# Patient Record
Sex: FEMALE | Race: BLACK OR AFRICAN AMERICAN | Employment: OTHER | URBAN - METROPOLITAN AREA
[De-identification: names, ages, dates, MRNs, and addresses within clinical notes are randomized per-mention and may not be internally consistent; named-entity substitution may affect disease eponyms.]

---

## 2017-06-07 ENCOUNTER — APPOINTMENT (OUTPATIENT)
Dept: PHYSICAL THERAPY | Age: 72
End: 2017-06-07

## 2017-06-14 ENCOUNTER — HOSPITAL ENCOUNTER (OUTPATIENT)
Dept: PHYSICAL THERAPY | Age: 72
Discharge: HOME | End: 2017-06-14
Payer: MEDICARE

## 2017-06-14 PROCEDURE — 97110 THERAPEUTIC EXERCISES: CPT

## 2017-06-14 PROCEDURE — G8978 MOBILITY CURRENT STATUS: HCPCS

## 2017-06-14 PROCEDURE — 97162 PT EVAL MOD COMPLEX 30 MIN: CPT

## 2017-06-14 PROCEDURE — G8979 MOBILITY GOAL STATUS: HCPCS

## 2017-06-14 NOTE — PROGRESS NOTES
PHYSICAL THERAPY - DAILY TREATMENT NOTE    Patient Name: Rosa Sullivan        Date: 2017  : 1945   YES Patient  Verified  Visit #:     Insurance: Payor: Rick Espinosa / Plan: [facility] CARE / Product Type: Managed Care Medicare /      In time: 1:00 Out time: 2:00   Total Treatment Time: 60     Medicare Time Tracking (below)   Total Timed Codes (min):  60 1:1 Treatment Time:  60     TREATMENT AREA =  R knee    SUBJECTIVE    Pain Level (on 0 to 10 scale):  4  / 10   Medication Changes/New allergies or changes in medical history, any new surgeries or procedures? YES    If yes, update Summary List   Subjective Functional Status/Changes:  []  No changes reported     History of Condition: Pt reports to PT with persistent knee pain that has been consistent after a TKA in  and 2 revisions due to pain. Pt denies any malpositioning of components or infections causing the revisions. Pt reports increased knee pain in the opposite knee now due to an antalgic gait pattern. 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)     Pt Goals: \"\"    FOTO:          OBJECTIVE  Physical Therapy Evaluation - Knee    Gait:  [] Normal    [] Abnormal    [] Antalgic    [] NWB    Device: SPC, upright 3-point gait    Describe:     ROM / Strength  [] Unable to assess            Strength (1-5)    Left Right   Hip Flexion 4+ 4+    Extension 3- 3-    Abduction 3- 3-    Adduction  IR/ER   4-/4-   4-/3-   Knee Flexion 5 5    Extension 5 5   Ankle Plantarflexion 4 4    Dorsiflexion 5 5       Flexibility: [] Unable to assess at this time  Hamstrings:    (L) Tightness= [] WNL   [x] Min   [] Mod   [] Severe    (R) Tightness= [] WNL   [] Min   [x] Mod   [] Severe  Quadriceps: (L) Tightness= [] WNL   [] Min   [] Mod   [] Severe    (R) Tightness= [] WNL   [] Min   [] Mod   [] Severe  Gastroc:      (L) Tightness= [] WNL   [] Min   [] Mod   [] Severe    (R) Tightness= [] WNL   [] Min   [] Mod   [] Severe  Other:    Palpation:   Neg/Pos  Neg/Pos  Neg/Pos   Joint Line  Quad tendon  Patellar ligament    Patella  Fibular head  Pes Anserinus    Tibial tubercle  Hamstring tendons  Infrapatellar fat pad      Optional Tests:   Patellar Positioning (Static)   []L []R Normal []L []R Lateral   []L []R Lopez Gallon      []L []R Medial   []L []R [place]    Patellar Tracking   []L []R Glide (Lat)   []L []R Tilt (Lat)     []L []R Glide (Med)  []L []R Tilt (Med)      []L []R Tile (Inf)     Patellar Mobility   []L []R Hypermobile []L []R Hypomobile         Girth Measurements in cm:      4 in above midpatella   2 in above midpatella  at  midpatella  2 in below midpatella   4 in below midpatella   Left        Right           Lachmans  [] Neg    [] Pos Posterior Drawer [] Neg    [] Pos  Pivot Shift  [] Neg    [] Pos Posterior Sag  [] Neg    [] Pos  Dayna's Test [] Neg    [] Pos Richard's Test  [] Neg    [] Pos  Squat   [] Neg    [] Pos          Ely's Test             [] Neg    [] Pos  Valgus@ 0 Degrees [] Neg    [] Pos Radha-Chapo [] Neg    [] Pos  Valgus@ 30 Degrees [] Neg    [] Pos Patellar Apprehension[] Neg    [] Pos  Varus@ 0 Degrees [] Neg    [] Pos Apley's Compression [] Neg    [] Pos  Varus@ 30 Degrees [] Neg    [] Pos Apley's Distraction [] Neg    [] Pos  Anterior Drawer [] Neg    [] Pos Other:                  [] Neg    [] Pos               Modalities Rationale: To decrease pain and inflammation.      min [] Estim, type/location:                                      []  att     []  unatt     []  w/US     []  w/ice    []  w/heat    min []  Mechanical Traction: type/lbs                   []  pro   []  sup   []  int   []  cont    []  before manual    []  after manual    min []  Ultrasound, settings/location:     10 min []  Iontophoresis w/ dexamethasone, location: R medial joint line                                              []  take home patch       []  in clinic    min []  Ice     []  Heat    location/position:     min []  Vasopneumatic Device, press/temp:     min []  Other:    [] Skin assessment post-treatment (if applicable):    []  intact    []  redness- no adverse reaction     []redness  adverse reaction:      25 min Therapeutic Exercise:  [x]  See flow sheet   Rationale:      increase ROM and increase strength to improve the patients ability to perform ADL's with improved hip girdle stability. 15 min Therapeutic Activity: FOTO administration   Rationale:    To determine a functional baseline from which to build a therapeutic exercise program.     min Patient Education:  YES  Reviewed HEP   []  Progressed/Changed HEP based on:   HEP issued      Other Objective/Functional Measures:    See above     Post Treatment Pain Level (on 0 to 10) scale:   4  / 10     ASSESSMENT    Assessment/Changes in Function:     Justification for Eval Code Complexity: MODERATE  Patient History (low 0, mod 1-2, high 3-4): OA, increased BMI, depression, bladder prolapse, 2 TKA revisions HIGH   Examination (low 1-2, mod 3+, high 4+): LE AROM, LE MMT, gait analysis MODERATE   Clinical Presentation (low: stable/uncomplicated; mod: evolving; high: unstable/unpredictable): unpredictable based off previous outcomes with PT HIGH  Clinical Decision Making (low , mod 26-74, high 1-25): 38 MODERATE     []  See Progress Note/Recertification   Patient will continue to benefit from skilled PT services to modify and progress therapeutic interventions, address functional mobility deficits, address ROM deficits, address strength deficits, analyze and address soft tissue restrictions, analyze and cue movement patterns, analyze and modify body mechanics/ergonomics and assess and modify postural abnormalities to attain remaining goals.    Progress toward goals / Updated goals:    Goals established, See PoC     PLAN    [x]  Upgrade activities as tolerated YES Continue plan of care   []  Discharge due to :    [x]  Other: Initiate PoC     Therapist: Luc Rios    Date: 6/14/2017 Time: 1:11 PM

## 2017-06-14 NOTE — PROGRESS NOTES
[address] [facility] PHYSICAL THERAPY  [address] [facility], [address] - Phone: [phone]  Fax: [phone] / [address] [facility]  Patient Name: Kelsey Rogel : 1945   Medical   Diagnosis: Right knee pain [M25.561] Treatment Diagnosis: R mechanical knee pain   Onset Date:      Referral Source: Chapincito George MD [facility]): 2017   Prior Hospitalization: See medical history Provider #: [id number]   Prior Level of Function: Independent with ADL's and ambulating without an AD   Comorbidities: OA, increased BMI, depression, bladder prolapse, 2 TKA revisions   Medications: Verified on Patient Summary List   The Plan of Care and following information is based on the information from the initial evaluation.   ===========================================================================================  Assessment / key information:  Kelsey Rogel is a 73 y.o.  female with Dx: Right knee pain [M25.561], signs and symptoms consistent with R mechanical knee pain. Pt reports to PT with persistent knee pain that has been consistent after a TKA in  and 2 revisions (last 16) due to pain. Pt denies any malpositioning of components or infections causing the revisions. Pt reports increased knee pain in the opposite knee due to her antalgic gait pattern and now wears a hinged knee brace on the L. The pt does does require assistance of a personal care aide 4 hours daily to assist with ADL's that are limited due to increased WB. Objective: Pt demonstrates 0-110 deg AROM on the R and 2-115 deg on the R. The pt demonstrates significant hip girdle strength deficits detailed below. The patient does have a positive valgus stress test on the R with medial joint pain but no laxity and lateral joint pain on the L with valgus stress testing again with no laxity.  The pt also demonstrates a positive Radha's on the R indicating possible meniscal involvement. The pt's patella on the L is significantly superiorly translated and laterally tilted as well. ROM / Strength  [] Unable to assess Strength (1-5)      Left Right   Hip Flexion 4+ 4+     Extension 3- 3-     Abduction 3- 3-     Adduction  IR/ER    4-/4-    4-/3-   Knee Flexion 5 5     Extension 5 5   Ankle Plantarflexion 4 4     Dorsiflexion 5 5      FOTO score 38 points indicating 62% limitation in functional ability. Patient would benefit from skilled PT to address the below listed impairments.  Thank you for your referral.  ===========================================================================================  Eval Complexity: History: HIGH Complexity :3+ comorbidities / personal factors will impact the outcome/ POC Exam:MEDIUM Complexity : 3 Standardized tests and measures addressing body structure, function, activity limitation and / or participation in recreation  Presentation: HIGH Complexity : Unstable and unpredictable characteristics  Clinical Decision Making:MEDIUM Complexity : FOTO score of 26-74Overall Complexity:MEDIUM    Problem List: pain affecting function, decrease ROM, decrease strength, impaired gait/ balance, decrease ADL/ functional abilitiies, decrease activity tolerance, decrease flexibility/ joint mobility and decrease transfer abilities   Treatment Plan may include any combination of the following: Therapeutic exercise, Therapeutic activities, Neuromuscular re-education, Physical agent/modality, Gait/balance training, Manual therapy, Aquatic therapy, Patient education, Self Care training, Functional mobility training, Home safety training and Stair training  Patient / Family readiness to learn indicated by: asking questions, trying to perform skills and interest  Persons(s) to be included in education: patient (P)  Barriers to Learning/Limitations: previous unsuccessful episodes of PT earlier this year  Measures taken: na Patient Goal (s): \"To make this R knee feel better so I can walk better, maybe without the cane. \"   Patient self reported health status: fair  Rehabilitation Potential: fair   Short Term Goals: To be accomplished in  2-3  weeks:  1. Patient will demonstrate compliance with HEP for symptom management at home. 2. Patient will demonstrate a sit<>stand with no UE support to indicate improved efficiency with ADL's.  3. Patient will report at least 25% improvements in symptoms associated with Buddhist activities.  Long Term Goals: To be accomplished in  4-6  weeks:  1. Patient will be independent with HEP to self-manage and prevent symptoms upon DC. 2.  Patient will improve FOTO score by at least 11 points to indicate improved functional status. 3.  Patient will demonstrate at least 4- hip extension and hip abduction MMT to improve pelvic stability with ambulation. Frequency / Duration:   Patient to be seen  2-3  times per week for 4-6  weeks:  Patient / Caregiver education and instruction: self care, activity modification and exercises  G-Codes (GP): Mobility: [id number] Current  CL= 60-79%   Goal  CK= 40-59%. The severity rating is based on the FOTO Score    Therapist Signature: Phu Hernandez DPT Date: 3/27/5235   Certification Period: 6/14/17 - 9/13/17 Time: 1:15 PM   ===========================================================================================  I certify that the above Physical Therapy Services are being furnished while the patient is under my care. I agree with the treatment plan and certify that this therapy is necessary. Physician Signature:        Date:       Time:     Please sign and return to [employer] or you may fax the signed copy to [phone]. Thank you.

## 2017-06-19 ENCOUNTER — APPOINTMENT (OUTPATIENT)
Dept: PHYSICAL THERAPY | Age: 72
End: 2017-06-19
Payer: MEDICARE

## 2017-06-21 ENCOUNTER — HOSPITAL ENCOUNTER (OUTPATIENT)
Dept: PHYSICAL THERAPY | Age: 72
Discharge: HOME | End: 2017-06-21
Payer: MEDICARE

## 2017-06-21 ENCOUNTER — APPOINTMENT (OUTPATIENT)
Dept: PHYSICAL THERAPY | Age: 72
End: 2017-06-21
Payer: MEDICARE

## 2017-06-21 PROCEDURE — 97530 THERAPEUTIC ACTIVITIES: CPT

## 2017-06-21 PROCEDURE — 97110 THERAPEUTIC EXERCISES: CPT

## 2017-06-21 NOTE — PROGRESS NOTES
PHYSICAL THERAPY - DAILY TREATMENT NOTE    Patient Name: Yolis Hannah        Date: 2017  : 1945   yes Patient  Verified  Visit #:   2     Insurance: Payor: Jacque Morgantana / Plan: [employer] [facility] / Product Type: Managed Care Medicare /      In time: 200 Out time: 244   Total Treatment Time: 44     Medicare Time Tracking (below)   Total Timed Codes (min):  34 1:1 Treatment Time:  34     TREATMENT AREA =  Right knee pain [M25.561]    SUBJECTIVE  Pain Level (on 0 to 10 scale):  3  / 10   Medication Changes/New allergies or changes in medical history, any new surgeries or procedures?    no  If yes, update Summary List   Subjective Functional Status/Changes:  []  No changes reported     \"I know I need to walk better.  I will do my HEP everyday\"          OBJECTIVE  Modalities Rationale:     decrease inflammation and decrease pain to improve patient's ability to  perform ADLs/prolong stding and amb/stairs with ease    min [] Estim, type/location:                                      []  att     []  unatt     []  w/US     []  w/ice    []  w/heat    min []  Mechanical Traction: type/lbs                   []  pro   []  sup   []  int   []  cont    []  before manual    []  after manual    min []  Ultrasound, settings/location:      min []  Iontophoresis w/ dexamethasone, location:                                               []  take home patch       []  in clinic   10 min [x]  Ice     []  Heat    location/position: supine with elevation    min []  Vasopneumatic Device, press/temp:     min []  Other:    [x] Skin assessment post-treatment (if applicable):    [x]  intact    []  redness- no adverse reaction     []redness  adverse reaction:        26 min Therapeutic Exercise:  [x]  See flow sheet   Rationale:      increase ROM and increase strength to improve the patients ability to  perform ADLs/prolong stding and amb/stairs with ease          8 min Therapeutic Activity: HRs for push off and TRs for ankle HS during gait  HK amb x 45' with SC and supervision    Rationale:    increase ROM, increase strength, improve coordination, improve balance and increase proprioception to improve the patients ability to  perform ADLs/prolong stding and amb/stairs with ease             min Patient Education:  yes  Reviewed HEP   []  Progressed/Changed HEP based on:  Pt ed on importance and benefits of compliance with HEP, core strength/stability and proper posture; pt verbalized understanding         Other Objective/Functional Measures:    VCs + demo to perform proper technique for TE  Initiated TE per flowsheet without c/o p!  demos poor gait tech,  VCs to increase hip/knee flex during swing phase and to increase HS for proper gait  demos 25% HR AROM    Post Treatment Pain Level (on 0 to 10) scale:   0  / 10     ASSESSMENT  Assessment/Changes in Function:     Progressed there-ex without c/o increase p! []  See Progress Note/Recertification   Patient will continue to benefit from skilled PT services to modify and progress therapeutic interventions, address functional mobility deficits, address ROM deficits, address strength deficits, analyze and address soft tissue restrictions, analyze and cue movement patterns, analyze and modify body mechanics/ergonomics, assess and modify postural abnormalities and instruct in home and community integration to attain remaining goals.    Progress toward goals / Updated goals:    Pt's first visit since IE, no noted progress        PLAN  [x]  Upgrade activities as tolerated yes Continue plan of care   []  Discharge due to :    []  Other:      Therapist: Khadijah Mckay PTA    Date: 6/21/2017 Time: 2:44 PM     Future Appointments  Date Time Provider Radha Bryson   6/26/2017 2:00 PM [address] [place] [address]   6/29/2017 2:00 PM [address] [place] [address]

## 2017-06-26 ENCOUNTER — APPOINTMENT (OUTPATIENT)
Dept: PHYSICAL THERAPY | Age: 72
End: 2017-06-26
Payer: MEDICARE

## 2017-06-29 ENCOUNTER — HOSPITAL ENCOUNTER (OUTPATIENT)
Dept: PHYSICAL THERAPY | Age: 72
Discharge: HOME | End: 2017-06-29
Payer: MEDICARE

## 2017-06-29 PROCEDURE — 97110 THERAPEUTIC EXERCISES: CPT

## 2017-06-29 NOTE — PROGRESS NOTES
PHYSICAL THERAPY - DAILY TREATMENT NOTE    Patient Name: Maryanne Res        Date: 2017  : 1945   YES Patient  Verified  Visit #:   3     Insurance: Payor: Kena Mckenna / Plan: [employer] / Product Type: Managed Care Medicare /      In time: 1:55 Out time: 2:37   Total Treatment Time: 42     Medicare Time Tracking (below)   Total Timed Codes (min):  32 1:1 Treatment Time:  32     TREATMENT AREA =   Right knee pain [M25.561    SUBJECTIVE    Pain Level (on 0 to 10 scale):  4  / 10   Medication Changes/New allergies or changes in medical history, any new surgeries or procedures? NO    If yes, update Summary List   Subjective Functional Status/Changes:  []  No changes reported     Pt c/o pain B knees, states her L one is starting to hurt her too. Pt reports soreness for a couple of days after last PT session.            OBJECTIVE    Modalities Rationale:  palliative      min [] Estim, type/location:                                      []  att     []  unatt     []  w/US     []  w/ice    []  w/heat    min []  Mechanical Traction: type/lbs                   []  pro   []  sup   []  int   []  cont    []  before manual    []  after manual    min []  Ultrasound, settings/location:      min []  Iontophoresis w/ dexamethasone, location:                                               []  take home patch       []  in clinic   10 min [x]  Ice     []  Heat    location/position: Supine with LE wedge    min []  Vasopneumatic Device, press/temp:     min []  Other:    [x] Skin assessment post-treatment (if applicable):    [x]  intact    []  redness- no adverse reaction     []redness  adverse reaction:      32 min Therapeutic Exercise:  [x]  See flow sheet   Rationale:     Increase LE ROM/flexibility, increase strength and increase proprioception to improve the patients ability to perform ADL's and gait safely and I to allow for increased activity tolerance and increased functional mobility. min Patient Education:  YES  Reviewed HEP   [x]  Progressed/Changed HEP based on:   Issued HEP, see HO in chart     Other Objective/Functional Measures:    Pt with c/o fatigue and L knee pain with warm-up on recumbent bike, only able to perform 4' this session. Added SLR for increased quad strength. VCing for quad set to prevent quad lag. VCing for neutral hip alignment with SL hip abd. Pt with c/o ms fatigue. Post Treatment Pain Level (on 0 to 10) scale:   4  / 10     ASSESSMENT    Assessment/Changes in Function:     Pt with good tolerance to ther-ex, but no change in pain level reported. []  See Progress Note/Recertification   Patient will continue to benefit from skilled PT services tomodify and progress therapeutic interventions, address functional mobility deficits, address ROM deficits, address strength deficits, analyze and address soft tissue restrictions, analyze and cue movement patterns, analyze and modify body mechanics/ergonomics, assess and modify postural abnormalities and instruct in home and community integration to attain remaining goals. Progress toward goals / Updated goals: · Short Term Goals: To be accomplished in  2-3  weeks:  1. Patient will demonstrate compliance with HEP for symptom management at home.established HEP 6/29/17  2. Patient will demonstrate a sit<>stand with no UE support to indicate improved efficiency with ADL's.  3. Patient will report at least 25% improvements in symptoms associated with Scientology activities.      PLAN    []  Upgrade activities as tolerated YES Continue plan of care   []  Discharge due to :    []  Other:      Therapist: Fouzia Felton PTA    Date: 6/29/2017 Time: 3:58 PM     Future Appointments  Date Time Provider Radha Bryson   7/5/2017 1:00 PM [address] [place] [address]   7/7/2017 2:00 PM [address] [place] [address]   7/17/2017 1:30 PM [address] [place] [address]   7/21/2017 1:30 PM [address] [facility]   7/24/2017 1:00  [facility]   7/28/2017 1:00  [facility]

## 2017-07-05 ENCOUNTER — APPOINTMENT (OUTPATIENT)
Dept: PHYSICAL THERAPY | Age: 72
End: 2017-07-05
Payer: MEDICARE

## 2017-07-07 ENCOUNTER — APPOINTMENT (OUTPATIENT)
Dept: PHYSICAL THERAPY | Age: 72
End: 2017-07-07
Payer: MEDICARE

## 2017-07-17 ENCOUNTER — APPOINTMENT (OUTPATIENT)
Dept: PHYSICAL THERAPY | Age: 72
End: 2017-07-17
Payer: MEDICARE

## 2017-07-21 ENCOUNTER — HOSPITAL ENCOUNTER (OUTPATIENT)
Dept: PHYSICAL THERAPY | Age: 72
Discharge: HOME | End: 2017-07-21
Payer: MEDICARE

## 2017-07-21 PROCEDURE — 97033 APP MDLTY 1+IONTPHRSIS EA 15: CPT

## 2017-07-21 PROCEDURE — G8979 MOBILITY GOAL STATUS: HCPCS

## 2017-07-21 PROCEDURE — 97110 THERAPEUTIC EXERCISES: CPT

## 2017-07-21 PROCEDURE — G8978 MOBILITY CURRENT STATUS: HCPCS

## 2017-07-21 PROCEDURE — 97530 THERAPEUTIC ACTIVITIES: CPT

## 2017-07-21 NOTE — PROGRESS NOTES
[address]  [facility] PHYSICAL THERAPY  [address] [facility] [address], [address] - Phone: [phone]  Fax: [phone] OF CARE/RECERTIFICATION FOR PHYSICAL THERAPY          Patient Name: Shanti Baig : 1945   Treatment/Medical Diagnosis: Right knee pain [M25.561]   Onset Date:     Referral Source: Barbie Collet, MD [facility]): 17   Prior Hospitalization: See Medical History Provider #: [id number]   Prior Level of Function: Independent with ADL's and ambulating without an AD   Comorbidities: OA, increased BMI, depression, bladder prolapse, 2 TKA revisions   Medications: Verified on Patient Summary List   Visits from [facility]: 4 Missed Visits: 4     Goal/Measure of Progress Goal Met? 1. Patient will demonstrate compliance with HEP for symptom management at home. Status at last Eval: NA Current Status: noncompliant no   2. Patient will demonstrate a sit<>stand with no UE support to indicate improved efficiency with ADL's. Status at last Eval: B HHA Current Status: B HHA no   3. Patient will report at least 25% improvements in symptoms associated with Amish activities   Status at last Eval: NA Current Status: 0% no     Therapy has consisted of active warm-up on the bike, knee flexion AAROM exercises, and hip girdle strengthening. Key Functional Changes/Progress: Pt's FOTO score decreased to 24 indicating 66% limitation in functional ability. The pt correlated her increase in pain to being out of town in the midst of her PT for longer than expected and and poor compliance with her HEP during this time. The pt can perform exercises without an increase in pain, but still requires B HHA with sit<>stand transfers.     Problem List: pain affecting function, decrease strength, edema affecting function, impaired gait/ balance, decrease ADL/ functional abilitiies, decrease activity tolerance, decrease flexibility/ joint mobility and decrease transfer abilities   Treatment Plan may include any combination of the following: Therapeutic exercise, Therapeutic activities, Neuromuscular re-education, Physical agent/modality, Gait/balance training, Manual therapy, Aquatic therapy, Patient education, Self Care training, Functional mobility training, Home safety training and Stair training   Goals for this certification period include and are to be achieved in   3-4  weeks:  1. Continue LTG above. Frequency / Duration:   Patient to be seen   2-3   times per week for   3-4    weeks:  G-Codes (GP): Mobility: [id number] Current  CL= 60-79%   Goal  CK= 40-59%. The severity rating is based on the FOTO Score  Assessments/Recommendations: The patient would benefit from continued skilled interventions to address the pt's functional limitaitons. However, with continued noncompliance, pt will be DC. If you have any questions/comments please contact us directly at [phone]. Thank you for allowing us to assist in the care of your patient. Therapist Signature: Brian Akers DPT Date: 6/87/6433   Certification Period:  Reporting Period: 6/14/17 - 9/13/17  /14/17 - 7/21/17 Time: 5:15 PM   NOTE TO PHYSICIAN:  PLEASE COMPLETE THE ORDERS BELOW AND FAX TO   [facility]: [phone] If you are unable to process this request in 24 hours please contact our office: [phone].    ___ I have read the above report and request that my patient continue as recommended.   ___ I have read the above report and request that my patient continue therapy with the following changes/special instructions: ________________________________________________   ___ I have read the above report and request that my patient be discharged from therapy.      Physician Signature:        Date:       Time:

## 2017-07-21 NOTE — PROGRESS NOTES
PHYSICAL THERAPY - DAILY TREATMENT NOTE    Patient Name: Cooper Guajardo        Date: 2017  : 1945   YES Patient  Verified  Visit #:   4     Insurance: Payor: [employer]  / Plan: [employer] / Product Type: Managed Care Medicare /      In time: 1:30 Out time: 2:10   Total Treatment Time: 40     Medicare Time Tracking (below)   Total Timed Codes (min):  40 1:1 Treatment Time:  40     TREATMENT AREA =  Right knee pain [M25.561    SUBJECTIVE    Pain Level (on 0 to 10 scale):  4  / 10   Medication Changes/New allergies or changes in medical history, any new surgeries or procedures? NO    If yes, update Summary List   Subjective Functional Status/Changes:  []  No changes reported     Pt reports that she was out of town longer than expected and wasn't good about doing her exercises while she was gone. Pt reports no change in her R knee pain at this time and states that she is still having pain in R knee that limits her standing and walking tolerance. Pt c/o pain on the inside of her R knee and R shin. Pt also c/o intermittent L knee pain.         OBJECTIVE    Modalities Rationale:  decrease pain and inflammation      min [] Estim, type/location:                                      []  att     []  unatt     []  w/US     []  w/ice    []  w/heat    min []  Mechanical Traction: type/lbs                   []  pro   []  sup   []  int   []  cont    []  before manual    []  after manual    min []  Ultrasound, settings/location:     8 min [x]  Iontophoresis w/ dexamethasone, location: take home patch to medial R knee                                              []  take home patch       []  in clinic    min []  Ice     []  Heat    location/position:     min []  Vasopneumatic Device, press/temp:     min []  Other:    [x] Skin assessment post-treatment (if applicable):    [x]  intact    []  redness- no adverse reaction     []redness  adverse reaction:      22 min Therapeutic Exercise:  [x]  See flow sheet   Rationale:     Increase LE ROM/flexibility, increase strength and increase proprioception to improve the patients ability to perform ADL's and gait safely and I to allow for increased activity tolerance and increased functional mobility. 10 min Therapeutic Activity: FOTO    Rationale:    functional assessment       min Patient Education:  YES  Reviewed HEP   []  Progressed/Changed HEP based on:   Discussed importance of compliance with HEP and pt to resume ex daily at home. Pt agreeable. Other Objective/Functional Measures:    FOTO 34    Pt requires 2 UE A for sit>stand and 1 UE A for stand>sit secondary to R>L knee pain. Post Treatment Pain Level (on 0 to 10) scale:   4  / 10     ASSESSMENT    Assessment/Changes in Function:     Pt's FOTO score decreased 4 points since IE indicating minimal change in activity tolerance and correlates to a 66% functional limitation. Mobility [id number] Current  CL= 60-79%   Goal  CK= 40-59%. The severity rating is based on the FOTO Score     [x]  See Progress Note/Recertification   Patient will continue to benefit from skilled PT services to modify and progress therapeutic interventions, address functional mobility deficits, address ROM deficits, address strength deficits, analyze and address soft tissue restrictions, analyze and cue movement patterns, analyze and modify body mechanics/ergonomics, assess and modify postural abnormalities and instruct in home and community integration to attain remaining goals. Progress toward goals / Updated goals: · Short Term Goals: To be accomplished in  2-3  weeks:  1. Patient will demonstrate compliance with HEP for symptom management at home. NOT MET   2. Patient will demonstrate a sit<>stand with no UE support to indicate improved efficiency with ADL's. NOT MET   3. Patient will report at least 25% improvements in symptoms associated with Jew activities.  NOT MET PLAN    []  Upgrade activities as tolerated YES Continue plan of care   []  Discharge due to :    []  Other:      Therapist: Shabana Holder PTA    Date: 7/21/2017 Time: 1:34 PM     Future Appointments  Date Time Provider Radha Bryson   7/24/2017 1:00  [facility]   7/28/2017 1:00  [facility]

## 2017-07-24 ENCOUNTER — HOSPITAL ENCOUNTER (OUTPATIENT)
Dept: PHYSICAL THERAPY | Age: 72
Discharge: HOME | End: 2017-07-24
Payer: MEDICARE

## 2017-07-24 PROCEDURE — 97033 APP MDLTY 1+IONTPHRSIS EA 15: CPT

## 2017-07-24 PROCEDURE — 97110 THERAPEUTIC EXERCISES: CPT

## 2017-07-24 PROCEDURE — 97530 THERAPEUTIC ACTIVITIES: CPT

## 2017-07-24 NOTE — PROGRESS NOTES
PHYSICAL THERAPY - DAILY TREATMENT NOTE    Patient Name: Elizabeth Small        Date: 2017  : 1945   YES Patient  Verified  Visit #:    Insurance: Payor: [employer] / Plan: [employer] / Product Type: Managed Care Medicare /      In time: 1:00 Out time: 2:05   Total Treatment Time: 65     Medicare Time Tracking (below)   Total Timed Codes (min):  65 1:1 Treatment Time:  65     TREATMENT AREA =  Right knee pain [M25.561]    SUBJECTIVE    Pain Level (on 0 to 10 scale):  3  / 10   Medication Changes/New allergies or changes in medical history, any new surgeries or procedures? NO     If yes, update Summary List   Subjective Functional Status/Changes:  []  No changes reported     \"I will be able to start coming more now. \"          OBJECTIVE    Modalities: Iontophoresis to decrease pain and inflammation. 4 hour patch applied to medial joint line. 8 minutes setup. 44 min Therapeutic Exercise:  [x]  See flow sheet   Rationale:      increase ROM and increase strength to improve the patients ability to perform ADL's with improved LE flexiblity and stability. 8 min Therapeutic Activity: Sit<>stand with R theraband 2 x 10, gait training with SPC 3 x 30 ft   Rationale: To increase safety and efficiency    min Patient Education:  YES  Reviewed HEP   []  Progressed/Changed HEP based on:   Patient reports compliance     Other Objective/Functional Measures:    Pt had difficulty with sit<>stand from a low seat, but could perform with appropriate form from a higher seat. Pt ambulates with significant R lateral lean which she can correct when taking smaller more purposeful steps. See flowsheet for more details. Post Treatment Pain Level (on 0 to 10) scale:   2  / 10     ASSESSMENT    Assessment/Changes in Function:     Pt presents to PT progressing in LE strengthening and will continue to decrease trendelenberg pattern related knee valgus and pain.      [] See Progress Note/Recertification   Patient will continue to benefit from skilled PT services to modify and progress therapeutic interventions, address functional mobility deficits, address ROM deficits, address strength deficits, analyze and address soft tissue restrictions, analyze and cue movement patterns and analyze and modify body mechanics/ergonomics to attain remaining goals. Progress toward goals / Updated goals:    First follow-up since reassessment.      PLAN    [x]  Upgrade activities as tolerated YES Continue plan of care   []  Discharge due to :    []  Other:      Therapist: Karilyn Soulier    Date: 7/24/2017 Time: 1:08 PM     Future Appointments  Date Time Provider Radha Bryson   7/28/2017 1:00 PM [address] [place] [address]       Progress toward goals / Updated goals:

## 2017-07-28 ENCOUNTER — HOSPITAL ENCOUNTER (OUTPATIENT)
Dept: PHYSICAL THERAPY | Age: 72
End: 2017-07-28
Payer: MEDICARE

## 2017-07-31 ENCOUNTER — HOSPITAL ENCOUNTER (OUTPATIENT)
Dept: PHYSICAL THERAPY | Age: 72
Discharge: HOME | End: 2017-07-31
Payer: MEDICARE

## 2017-07-31 PROCEDURE — 97033 APP MDLTY 1+IONTPHRSIS EA 15: CPT

## 2017-07-31 PROCEDURE — 97140 MANUAL THERAPY 1/> REGIONS: CPT

## 2017-07-31 PROCEDURE — 97110 THERAPEUTIC EXERCISES: CPT

## 2017-07-31 NOTE — PROGRESS NOTES
PHYSICAL THERAPY - DAILY TREATMENT NOTE    Patient Name: Lisandro Moffett        Date: 2017  : 1945   YES Patient  Verified  Visit #:     Insurance: Payor: Lamar Gillis / Plan: [employer] / Product Type: Managed Care Medicare /      In time: 130 Out time: 2:10   Total Treatment Time: 40     Medicare Time Tracking (below)   Total Timed Codes (min):  40 1:1 Treatment Time:  38     TREATMENT AREA = Right knee pain [M25.561]    SUBJECTIVE    Pain Level (on 0 to 10 scale):  3  / 10   Medication Changes/New allergies or changes in medical history, any new surgeries or procedures? NO    If yes, update Summary List   Subjective Functional Status/Changes:  []  No changes reported     \"It's been a lot better since I started. The only thing now is it's stiff and catches sometimes when I first get up to stand or walk. \"          OBJECTIVE    24(22) min Therapeutic Exercise:  [x]  See flow sheet   Rationale:      increase ROM, increase strength and facilitate proper motor control to improve the patients ADL tolerance and return to function     8 min Manual Therapy: Involved Knee Patellar G2-G4 mobs inferior/medial, STM to quads/HS/ITB interface, Ant/Post G3-G4 Tibofemoral glides, PROM flexion/Ext contract/relax   Rationale:      decrease pain, increase ROM, increase tissue extensibility, decrease trigger points and facilitate motor control to improve patient's ADL tolerance and return to function       Modalities Rationale: Prophylaxis/Palliative        min [] Estim, type/location:                                      []  att     []  unatt     []  w/US     []  w/ice    []  w/heat    min []  Mechanical Traction: type/lbs                   []  pro   []  sup   []  int   []  cont    []  before manual    []  after manual    min []  Ultrasound, settings/location:     8 min [x]  Iontophoresis w/ dexamethasone, location: Medial aspect of knee []  take home patch       []  in clinic    min []  Ice     []  Heat    Position/location:     min []  Vasopneumatic Device, press/temp:     min []  Other:    [] Skin assessment post-treatment (if applicable):    []  intact    []  redness- no adverse reaction     []redness  adverse reaction:      Throughout Rx min Patient Education:  YES  Reviewed HEP   []  Progressed/Changed HEP based on:   Display of proper form in clinic  Improvement in condition and current complaints     Other Objective/Functional Measures:    Increased reps/sets/resistance per flow sheet. Post Treatment Pain Level (on 0 to 10) scale:   2  / 10     ASSESSMENT    Assessment/Changes in Function:     Tolerated treatment well without complaints of progression, indicating improved functional mobility       []  See Progress Note/Recertification   Patient will continue to benefit from skilled PT services to  modify and progress therapeutic interventions, address functional mobility deficits, address ROM deficits, address strength deficits, analyze and address soft tissue restrictions, analyze and cue movement patterns, analyze and modify body mechanics/ergonomics and instruct in home and community integration to attain remaining goals. Progress toward goals / Updated goals:    1st session since initial eval, no significant progress noted. PLAN     []  Upgrade activities as tolerated YES Continue plan of care   []  Discharge due to :    []  Other:      Therapist: Tyler Rosenberg \"BJ\" ISAAC Oliva, Cert. MDT, Cert. DN, Cert.  SMT    Date: 7/31/2017 Time: 2:13 PM   Future Appointments  Date Time Provider Radha Bryson   8/4/2017 2:00 PM [address] [place] [address]   8/7/2017 2:00 PM [address] [place] [address]   8/10/2017 2:30 PM [address] [place] [address]   8/14/2017 2:00 PM [facility]   8/17/2017 2:00 PM [address] [place] [address]   8/21/2017 2:00 PM [address] [place] [address]   8/24/2017 2:30 PM [place] [address] [place] [address],Second Floor [facility]   8/28/2017 2:00  [facility]   8/31/2017 2:00  [facility]

## 2017-08-04 ENCOUNTER — APPOINTMENT (OUTPATIENT)
Dept: PHYSICAL THERAPY | Age: 72
End: 2017-08-04
Payer: MEDICARE

## 2017-08-07 ENCOUNTER — HOSPITAL ENCOUNTER (OUTPATIENT)
Dept: PHYSICAL THERAPY | Age: 72
Discharge: HOME | End: 2017-08-07
Payer: MEDICARE

## 2017-08-07 PROCEDURE — 97110 THERAPEUTIC EXERCISES: CPT

## 2017-08-07 NOTE — PROGRESS NOTES
PHYSICAL THERAPY - DAILY TREATMENT NOTE    Patient Name: Cassie Flanagan        Date: 2017  : 1945   YES Patient  Verified  Visit #:     Insurance: Payor: Irving Chirinos / Plan: [place] [employer] [facility] / Product Type: Managed Care Medicare /      In time: 2:00 Out time: 2:40   Total Treatment Time: 40     Medicare Time Tracking (below)   Total Timed Codes (min):  30 1:1 Treatment Time:  30     TREATMENT AREA =  Right knee pain [M25.561]    SUBJECTIVE    Pain Level (on 0 to 10 scale):  3  / 10   Medication Changes/New allergies or changes in medical history, any new surgeries or procedures? NO    If yes, update Summary List   Subjective Functional Status/Changes:  []  No changes reported     Pt reports that her knee still get stiff, but feels like it's getting better slowly. Pt reports some soreness after manual PT last session.         OBJECTIVE    Modalities Rationale:  palliative      min [] Estim, type/location:                                      []  att     []  unatt     []  w/US     []  w/ice    []  w/heat    min []  Mechanical Traction: type/lbs                   []  pro   []  sup   []  int   []  cont    []  before manual    []  after manual    min []  Ultrasound, settings/location:      min []  Iontophoresis w/ dexamethasone, location:                                               []  take home patch       []  in clinic   10 min [x]  Ice     []  Heat    location/position: Supine with LE wedge    min []  Vasopneumatic Device, press/temp:     min []  Other:    [x] Skin assessment post-treatment (if applicable):    [x]  intact    [x]  redness- no adverse reaction     []redness  adverse reaction:      30 min Therapeutic Exercise:  [x]  See flow sheet   Rationale:     Increase LE ROM/flexibility, increase strength and increase proprioception to improve the patients ability to perform ADL's and gait safely and I to allow for increased activity tolerance and increased functional mobility. min Patient Education:  YES  Reviewed HEP   []  Progressed/Changed HEP based on: Other Objective/Functional Measures:    Pt with c/o R knee pain with unilateral WBing on R LE for hip hikes. Added hip extension in standing. Had pt perform alternating to decrease SLS time. VCing for hold time on LE stretches. Post Treatment Pain Level (on 0 to 10) scale:   2  / 10     ASSESSMENT    Assessment/Changes in Function:     Pt with good tolerance to progression of standing ex. []  See Progress Note/Recertification   Patient will continue to benefit from skilled PT services to modify and progress therapeutic interventions, address functional mobility deficits, address ROM deficits, address strength deficits, analyze and address soft tissue restrictions, analyze and cue movement patterns, analyze and modify body mechanics/ergonomics and instruct in home and community integration to attain remaining goals     Progress toward goals / Updated goals:    · Long Term Goals: To be accomplished in  4-6  weeks:  1. Patient will be independent with HEP to self-manage and prevent symptoms upon DC. 2.  Patient will improve FOTO score by at least 11 points to indicate improved functional status.   3.  Patient will demonstrate at least 4- hip extension and hip abduction MMT to improve pelvic stability Added standing hip ext this visit     PLAN    []  Upgrade activities as tolerated YES Continue plan of care   []  Discharge due to :    []  Other:      Therapist: Danice Habermann, PTA    Date: 8/7/2017 Time: 4:33 PM     Future Appointments  Date Time Provider Radha Bryson   8/10/2017 2:30 PM [address] [place] [address]   8/14/2017 2:00 PM [facility]   8/17/2017 2:00 PM [address] [place] [address]   8/21/2017 2:00 PM [address] [place] [address]   8/24/2017 2:30 PM [address] [place] [address]   8/28/2017 2:00 PM [address] [place] [address]   8/31/2017 2:00  [facility]

## 2017-08-10 ENCOUNTER — APPOINTMENT (OUTPATIENT)
Dept: PHYSICAL THERAPY | Age: 72
End: 2017-08-10
Payer: MEDICARE

## 2017-08-11 ENCOUNTER — HOSPITAL ENCOUNTER (OUTPATIENT)
Dept: PHYSICAL THERAPY | Age: 72
Discharge: HOME | End: 2017-08-11
Payer: MEDICARE

## 2017-08-11 PROCEDURE — 97140 MANUAL THERAPY 1/> REGIONS: CPT

## 2017-08-11 PROCEDURE — 97110 THERAPEUTIC EXERCISES: CPT

## 2017-08-11 PROCEDURE — 97530 THERAPEUTIC ACTIVITIES: CPT

## 2017-08-11 NOTE — PROGRESS NOTES
PHYSICAL THERAPY - DAILY TREATMENT NOTE    Patient Name: [facility]        Date: 2017  : 1945   YES Patient  Verified  Visit #:     Insurance: Payor: Veronica Osei / Plan: [employer] / Product Type: Managed Care Medicare /      In time: 2:10 Out time: 3:05   Total Treatment Time: 55     Medicare Time Tracking (below)   Total Timed Codes (min):  55 1:1 Treatment Time:  55     TREATMENT AREA =  Right knee pain [M25.561]    SUBJECTIVE    Pain Level (on 0 to 10 scale):  3  / 10   Medication Changes/New allergies or changes in medical history, any new surgeries or procedures? NO     If yes, update Summary List   Subjective Functional Status/Changes:  []  No changes reported     \"I don't think the patch makes any difference. Maybe I can get in the pool. \"          OBJECTIVE    39 min Therapeutic Exercise:  [x]  See flow sheet   Rationale:      increase ROM and increase strength to improve the patients ability to perform ADL's with improved hip girdle stability. 8 min Therapeutic Activity: Aquatics paperwork, step-up's, HK ambulation   Rationale: To increase safety and efficiency with ADL's.    8 min Manual Therapy: IASTM to the ITB with small and medium cups and PCP pipe   Rationale:      decrease pain, increase ROM and increase tissue extensibility to improve patient's ability to ambulate with improved activity tolerance. min Patient Education:  YES  Reviewed HEP   []  Progressed/Changed HEP based on:   Patient reports compliance     Other Objective/Functional Measures:    Pt still has difficulty with SLS activities and therefore performed exercises in standing in an alternating manner. The pt attempted IASTM to the ITB; however the pt could not tolerate either modality and requested not to perform again in the future. See below for more details.      Post Treatment Pain Level (on 0 to 10) scale:   2  / 10     ASSESSMENT    Assessment/Changes in Function:     Pt presents to PT progressing slowly in a strengthening program, with minimal symptom recovery. Started pool paperwork, but since the schedule is full administered the pt a list of free local pools to start ambulating in the water. []  See Progress Note/Recertification   Patient will continue to benefit from skilled PT services to modify and progress therapeutic interventions, address functional mobility deficits, address ROM deficits, address strength deficits, analyze and cue movement patterns and analyze and modify body mechanics/ergonomics to attain remaining goals. Progress toward goals / Updated goals: Addressed LTG 2 with bridges.      PLAN    [x]  Upgrade activities as tolerated YES Continue plan of care   []  Discharge due to :    []  Other:      Therapist: Eli Mcnair    Date: 8/11/2017 Time: 2:19 PM     Future Appointments  Date Time Provider Radha Bryson   8/14/2017 2:00 PM [facility]   8/17/2017 2:00 PM [address] [place] [address]   8/21/2017 2:00 PM [address] [place] [address]   8/24/2017 2:30 PM [address] [place] [address]   8/28/2017 2:00 PM [address] [place] [address]   8/31/2017 2:00 PM [address] [place] [address]

## 2017-08-14 ENCOUNTER — APPOINTMENT (OUTPATIENT)
Dept: PHYSICAL THERAPY | Age: 72
End: 2017-08-14
Payer: MEDICARE

## 2017-08-17 ENCOUNTER — HOSPITAL ENCOUNTER (OUTPATIENT)
Dept: PHYSICAL THERAPY | Age: 72
Discharge: HOME | End: 2017-08-17
Payer: MEDICARE

## 2017-08-17 PROCEDURE — G8979 MOBILITY GOAL STATUS: HCPCS

## 2017-08-17 PROCEDURE — 97110 THERAPEUTIC EXERCISES: CPT

## 2017-08-17 PROCEDURE — G8978 MOBILITY CURRENT STATUS: HCPCS

## 2017-08-17 PROCEDURE — 97530 THERAPEUTIC ACTIVITIES: CPT

## 2017-08-17 NOTE — PROGRESS NOTES
[address]  [facility] PHYSICAL THERAPY  [address], [address] Phone: [phone]  Fax: [phone] OF CARE/RECERTIFICATION FOR PHYSICAL THERAPY          Patient Name: Lisandro Moffett : 1945   Treatment/Medical Diagnosis: Right knee pain [M25.561]   Onset Date:     Referral Source: Gail Car MD Start of [facility]): 17   Prior Hospitalization: See Medical History Provider #: [id number]   Prior Level of Function: Independent with ADL's and ambulating without an AD   Comorbidities: OA, increased BMI, depression, bladder prolapse, 2 TKA revisions   Medications: Verified on Patient Summary List   Visits from [facility]: 9 Missed Visits: 4     Goal/Measure of Progress Goal Met? 1. Patient will demonstrate compliance with HEP for symptom management at home. Status at last Eval: noncompliant Current Status: compliant yes   2.  *Patient will demonstrate a sit<>stand with no UE support to indicate improved efficiency with ADL's. Status at last Eval: UE support Current Status: No UE support, moderate difficulty Progressing   3. Patient will report at least 25% improvements in symptoms associated with [employer] activities   Status at last Eval: 0% Current Status: 25% yes     Therapy has consisted of active warm-up on the bike, knee flexion AAROM exercises, hip girdle strengthening exercises, hamstring stretches, balance exercises and stair negotiation. Key Functional Changes/Progress: Pt's FOTO score improved to 42 indicating 58% limitation in functional ability. The pt demonstrates minor improvements in hip girdle MMT detailed below. However, the pt reports minimal to no improvement in symptom management.     MMT R LE  hip flex 4+/5   hip abd 4/5   hip ext 4-/5    Problem List: pain affecting function, decrease ROM, decrease strength, impaired gait/ balance, decrease ADL/ functional abilitiies, decrease activity tolerance and decrease flexibility/ joint mobility   Treatment Plan may include any combination of the following: Therapeutic exercise, Therapeutic activities, Neuromuscular re-education, Physical agent/modality, Gait/balance training, Manual therapy, Aquatic therapy, Patient education, Self Care training, Functional mobility training, Home safety training and Stair training  Patient Goal(s) has been updated and includes: \"To walk with less pain. \"    Goals for this certification period include and are to be achieved in   3-4  weeks:  1. Patient will be independent with HEP to self-manage and prevent symptoms upon DC. 2.   Patient will report at least a 3+ on the [facility] HENRIQUE to indicate improved efficiency with ADL's.  3.   Patient will improve FOTO score by at least 11 points to indicate improved functional status. Frequency / Duration:   Patient to be seen   1-2   times per week for   3-4    weeks:  G-Codes (GP): [id number] Current  CK= 60-79%   Goal  CK= 40-59%. The severity rating is based on the FOTO Score. Assessments/Recommendations: The patient would benefit from going on hold for 2 weeks due to minimal effectiveness of land therapy before transitioning to the aquatics program to increase hip girdle MMT in an environment of decreased WB. If you have any questions/comments please contact us directly at [phone]. Thank you for allowing us to assist in the care of your patient. Therapist Signature: Donna Carrillo DPT Date: 6/07/0585   Certification Period:  Reporting Period: NA  7/24/17 - 8/17/17 Time: 6:13 PM   NOTE TO PHYSICIAN:  PLEASE COMPLETE THE ORDERS BELOW AND FAX TO   [facility] Physical Therapy: [phone]   If you are unable to process this request in 24 hours please contact our office: [phone].    ___ I have read the above report and request that my patient continue as recommended.   ___ I have read the above report and request that my patient continue therapy with the following changes/special instructions: ________________________________________________   ___ I have read the above report and request that my patient be discharged from therapy.      Physician Signature:        Date:       Time:

## 2017-08-17 NOTE — PROGRESS NOTES
PHYSICAL THERAPY - DAILY TREATMENT NOTE    Patient Name: Brandie Zarate        Date: 2017  : 1945   YES Patient  Verified  Visit #:     Insurance: Payor: Renetta Don / Plan: [email or web address] / Product Type: Managed Care Medicare /      In time: 2:40 Out time: 3:10   Total Treatment Time: 30     Medicare Time Tracking (below)   Total Timed Codes (min):  30 1:1 Treatment Time:  30     TREATMENT AREA =  Right knee pain [M25.561]    SUBJECTIVE    Pain Level (on 0 to 10 scale):  4  / 10   Medication Changes/New allergies or changes in medical history, any new surgeries or procedures? NO    If yes, update Summary List   Subjective Functional Status/Changes:  []  No changes reported     Pt reports no improvement in her activity tolerance since starting PT. Pt reports that she is in the process of getting a used rollator and needs to know the measurement of her hips when she is sitting. OBJECTIVE    20 min Therapeutic Exercise:  [x]  See flow sheet   Rationale:   Increase LE ROM/flexibility, increase strength and increase proprioception to improve the patients ability to perform ADL's and gait safely and I to allow for increased activity tolerance and increased functional mobility. 10 min Therapeutic Activity:  FOTO   Rationale:    functional assessment      min Patient Education:  YES  Reviewed HEP   []  Progressed/Changed HEP based on: Other Objective/Functional Measures:    FOTO 42     MMT R LE  hip flex 4+/5   hip abd 4/5   hip ext 4-/5    Pt able to perform sit>stand from standard chair without UE A, but reports moderate difficulty. Pt able to perform sit>stand from standard chair with B UE A from seat of chair with minimal difficulty. Pt demo's I with current HEP.       Post Treatment Pain Level (on 0 to 10) scale:   3  / 10     ASSESSMENT    Assessment/Changes in Function:     Pt's FOTO score improved 4 points since IE indicating minimal change in activity tolerance and correlates to a 58% functional limitation. Mobility [id number] Current  CK= 40-59%   Goal  CK= 40-59%. The severity rating is based on the FOTO Score (continue same goal to achieve FOTO goal of 49)     [x]  See Progress Note/Recertification   Patient will continue to benefit from skilled PT services to  modify and progress therapeutic interventions, address functional mobility deficits, address ROM deficits, address strength deficits, analyze and address soft tissue restrictions, analyze and cue movement patterns, analyze and modify body mechanics/ergonomics and instruct in home and community integration to attain remaining goals     Progress toward goals / Updated goals:    · Long Term Goals: To be accomplished in  4-6  weeks:  1.  Patient will be independent with HEP to self-manage and prevent symptoms upon DC. Pt I with current HEP  2.  Patient will improve FOTO score by at least 11 points to indicate improved functional status. Progressing, FOTO=42  3.  Patient will demonstrate at least 4- hip extension and hip abduction MMT to improve pelvic stability MET       PLAN    []  Upgrade activities as tolerated YES Continue plan of care   []  Discharge due to :    [x]  Other: Pt will be placed on hold X 2 weeks then initiate aquatics once pool re-opened from maintenance.       Therapist: Milena Hagen PTA    Date: 8/17/2017 Time: 3:19 PM     Future Appointments  Date Time Provider Radha Bryson   9/5/2017 1:00 PM [address] [place] [address]   9/8/2017 11:00 AM [facility]   9/12/2017 1:00 PM [address] [place] [address]   9/15/2017 11:00 AM [facility]   9/19/2017 1:00 PM [address] [place] [address]   9/22/2017 11:00 AM [address] [place] [address]   9/26/2017 1:00 PM [address] [place] [address]   9/29/2017 11:00 AM [address] [place] [address]

## 2017-08-21 ENCOUNTER — APPOINTMENT (OUTPATIENT)
Dept: PHYSICAL THERAPY | Age: 72
End: 2017-08-21
Payer: MEDICARE

## 2017-08-24 ENCOUNTER — APPOINTMENT (OUTPATIENT)
Dept: PHYSICAL THERAPY | Age: 72
End: 2017-08-24
Payer: MEDICARE

## 2017-08-28 ENCOUNTER — APPOINTMENT (OUTPATIENT)
Dept: PHYSICAL THERAPY | Age: 72
End: 2017-08-28
Payer: MEDICARE

## 2017-08-31 ENCOUNTER — APPOINTMENT (OUTPATIENT)
Dept: PHYSICAL THERAPY | Age: 72
End: 2017-08-31
Payer: MEDICARE

## 2017-09-05 ENCOUNTER — HOSPITAL ENCOUNTER (OUTPATIENT)
Dept: PHYSICAL THERAPY | Age: 72
Discharge: HOME | End: 2017-09-05
Payer: MEDICARE

## 2017-09-05 PROCEDURE — 97113 AQUATIC THERAPY/EXERCISES: CPT

## 2017-09-05 NOTE — PROGRESS NOTES
PHYSICAL THERAPY - DAILY TREATMENT NOTE - AQUATICS    Patient Name: Brandie Zarate        Date: 2017  : 1945   YES Patient  Verified  Visit #:   10   of   17  Insurance: Payor: Renetta Don / Plan: [employer] / Product Type: Managed Care Medicare /      In time: 1:00 Out time: 1:46   Total Treatment Time: 46     Medicare Time Tracking (below)   Total Timed Codes (min):  46 1:1 Treatment Time:  23     TREATMENT AREA =  Right knee pain [M25.561]    SUBJECTIVE    Pain Level (on 0 to 10 scale):  3  / 10   Medication Changes/New allergies or changes in medical history, any new surgeries or procedures? NO    If yes, update Summary List   Subjective Functional Status/Changes:  []  No changes reported     Pt states ready to try water therapy. OBJECTIVE  46  (23) min Therapeutic Exercise:   [x]  Aquatic Therapy   Rationale:      increase strength, increase ROM, and improve balance to improve the patients ability to perform ADL's and ambulate with less pain and increase activity tolerance.       [x]   Patient Education:  Continue Aquatic Therapy and HEP as instructed     UE exercise resistance:                                                  LE exercises:                                                                 [x] none                                                                             []  thera-band resistance       [] small water weights                                                   [] no UE support       [] medium water weights                                              [x]  1 UE support        [] large water weights                                                   [x]  2 UE support          [] back supported on wall       [] thera-band      SLS UE support:                      [] both UE                          [] 1 UE       [] 1 finger/fingers       []  none       [] EC     Post Treatment Pain Level (on 0 to 10) scale:   2  / 10 Other  Introduction to aquatic therapy program.   Pt completed 10-20 reps of each exercise depending on difficulty/tolerance. Pt reports decreased R knee pain during and after aquatic therapy. ASSESSMENT    Assessment/Changes in Function:     Pt with good tolerance to initiation of aquatic exercises. []  See Progress Note/Recertification   Patient will continue to benefit from skilled PT services to modify and progress therapeutic interventions, address functional mobility deficits, address ROM deficits, address strength deficits, analyze and address soft tissue restrictions, analyze and cue movement patterns, analyze and modify body mechanics/ergonomics and instruct in home and community integration to attain remaining goals     Progress toward goals / Updated goals:    1. Patient will be independent with HEP to self-manage and prevent symptoms upon DC. 2.   Patient will report at least a 3+ on the [facility]FORT HENRIQUE to indicate improved efficiency with ADL's. Initiated aquatic PT today for pain management   3. Patient will improve FOTO score by at least 11 points to indicate improved functional status.      PLAN    []  Upgrade activities as tolerated YES Continue plan of care   []  Discharge due to :    []  Other:      Therapist: Leora Langston PTA    Date: 9/5/2017 Time: 12:59 PM     Future Appointments  Date Time Provider Radha Bryson   9/5/2017 1:00 PM [address] [place] [address]   9/8/2017 11:00 AM [address] [place] [address]   9/12/2017 1:00 PM [address] [place] [address]   9/15/2017 11:00 AM [address] [place] [address]   9/19/2017 1:00 PM [address] [place] [address]   9/22/2017 11:00 AM [address] [place] [address]   9/26/2017 1:00 PM [address] [place] [address]   9/29/2017 11:00 AM [address] [place] [address]

## 2017-09-08 ENCOUNTER — HOSPITAL ENCOUNTER (OUTPATIENT)
Dept: PHYSICAL THERAPY | Age: 72
Discharge: HOME | End: 2017-09-08
Payer: MEDICARE

## 2017-09-08 PROCEDURE — 97113 AQUATIC THERAPY/EXERCISES: CPT

## 2017-09-08 NOTE — PROGRESS NOTES
PHYSICAL THERAPY - DAILY TREATMENT NOTE - AQUATICS    Patient Name: Radha Hunter        Date: 2017  : 1945   YES Patient  Verified  Visit #:     Insurance: Payor: Elaine De La Cruz / Plan: [employer] / Product Type: Managed Care Medicare /      In time: 11:00 Out time: 11:46   Total Treatment Time: 55     Medicare Time Tracking (below)   Total Timed Codes (min):  46 1:1 Treatment Time:  23     TREATMENT AREA =  Right knee pain [M25.561]    SUBJECTIVE    Pain Level (on 0 to 10 scale):  4  / 10   Medication Changes/New allergies or changes in medical history, any new surgeries or procedures? NO    If yes, update Summary List   Subjective Functional Status/Changes:  []  No changes reported     Pt reports feeling fine right after the aquatic therapy, but noticed increased soreness in her R knee the next morning. OBJECTIVE  46  (23) min Therapeutic Exercise:   [x]  Aquatic Therapy   Rationale:      increase strength, increase ROM, and improve balance to improve the patients ability to perform ADL's and ambulate with less pain and increase activity tolerance.       [x]   Patient Education:  Continue Aquatic Therapy and HEP as instructed     UE exercise resistance:                                                  LE exercises:                                                                 [] none                                                                             []  thera-band resistance       [x] small water weights                                                   [] no UE support       [] medium water weights                                              [x]  1 UE support        [] large water weights                                                   [x]  2 UE support          [] back supported on wall       [] thera-band      SLS UE support:                      [] both UE                          [] 1 UE       [] 1 finger/fingers       [] none       [] EC     Post Treatment Pain Level (on 0 to 10) scale:   0  / 10     Other Added small resistance weights with UE ex. Pt with some difficulty stabilizing/balancing with UE ex with using resistance weights, but still able to perform ex with proper form. ASSESSMENT    Assessment/Changes in Function:     Pt with good tolerance to added resistance with UE ex.      []  See Progress Note/Recertification   Patient will continue to benefit from skilled PT services to modify and progress therapeutic interventions, address functional mobility deficits, address ROM deficits, address strength deficits, analyze and address soft tissue restrictions, analyze and cue movement patterns, analyze and modify body mechanics/ergonomics and instruct in home and community integration to attain remaining goals     Progress toward goals / Updated goals: 1.   Patient will be independent with HEP to self-manage and prevent symptoms upon DC. Mod VCing for form with aquatic ex. 2.   Patient will report at least a 3+ on the [facility]FORT HENRIQUE to indicate improved efficiency with ADL's.   3.   Patient will improve FOTO score by at least 11 points to indicate improved functional status.           PLAN    []  Upgrade activities as tolerated YES Continue plan of care   []  Discharge due to :    []  Other:      Therapist: Kj Joshua PTA    Date: 9/8/2017 Time: 4:18 PM     Future Appointments  Date Time Provider Radha Bryson   9/12/2017 1:00 PM [address] [place] [address]   9/15/2017 11:00 AM [address] [place] [address]   9/19/2017 1:00 PM [address] [place] [address]   9/22/2017 11:00 AM [address] [place] [address]   9/26/2017 1:00 PM [address] [place] [address]   9/29/2017 11:00 AM [address] [place] [address]

## 2017-09-12 ENCOUNTER — HOSPITAL ENCOUNTER (OUTPATIENT)
Dept: PHYSICAL THERAPY | Age: 72
Discharge: HOME | End: 2017-09-12
Payer: MEDICARE

## 2017-09-12 PROCEDURE — 97113 AQUATIC THERAPY/EXERCISES: CPT

## 2017-09-12 NOTE — PROGRESS NOTES
PHYSICAL THERAPY - DAILY TREATMENT NOTE - AQUATICS    Patient Name: Marisa Caldera        Date: 2017  : 1945   YES Patient  Verified  Visit #:     Insurance: Payor: Donita Dooley / Plan: [employer] / Product Type: Managed Care Medicare /      In time: 1:00 Out time: 1:47   Total Treatment Time: 47     Medicare Time Tracking (below)   Total Timed Codes (min):  47 1:1 Treatment Time:  47     TREATMENT AREA =   Right knee pain [M25.561]    SUBJECTIVE    Pain Level (on 0 to 10 scale):  3  / 10   Medication Changes/New allergies or changes in medical history, any new surgeries or procedures? NO    If yes, update Summary List   Subjective Functional Status/Changes:  []  No changes reported     Pt reports won't be able to make pool session Friday because going out of town. Pt states R knee sore again after last PT session. Pt states that she has pain on the inside of her R knee, her doctor told her it was bursitis. OBJECTIVE  47  (47) min Therapeutic Exercise:   [x]  Aquatic Therapy   Rationale:      increase strength, increase ROM, and improve balance to improve the patients ability to perform ADL's and ambulate with less pain and increase activity tolerance.       [x]   Patient Education:  Continue Aquatic Therapy and HEP as instructed     UE exercise resistance:                                                  LE exercises:                                                                 [] none                                                                             []  thera-band resistance       [x] small water weights                                                   [] no UE support       [] medium water weights                                              [x]  1 UE support        [] large water weights                                                   [x]  2 UE support          [] back supported on wall       [] thera-band      SLS UE support:                      [] both UE                          [] 1 UE       [] 1 finger/fingers       []  none       [] EC     Post Treatment Pain Level (on 0 to 10) scale:   0  / 10     Other  Pt reports pain free amb in water. Mod VCing for posture and form with aquatic ex. Pt demo's intermittent imbalance with amb and performing UE ex (with aquatic resistance weights)  in water. ASSESSMENT    Assessment/Changes in Function:     Pt reporting decreased pain/sx's while in water; however, pt with c/o delayed onset of soreness after sessions. []  See Progress Note/Recertification   Patient will continue to benefit from skilled PT services to modify and progress therapeutic interventions, address functional mobility deficits, address ROM deficits, address strength deficits, analyze and address soft tissue restrictions, analyze and cue movement patterns, analyze and modify body mechanics/ergonomics and instruct in home and community integration to attain remaining goals     Progress toward goals / Updated goals: 1.   Patient will be independent with HEP to self-manage and prevent symptoms upon DC. Mod VCing for form with aquatic ex. 2.   Patient will report at least a 3+ on the [facility]FORT HENRIQUE to indicate improved efficiency with ADL's. Pt reports decreased pain while in water, but with c/o delayed soreness after sessions  3.   Patient will improve FOTO score by at least 11 points to indicate improved functional status.           PLAN    []  Upgrade activities as tolerated YES Continue plan of care   []  Discharge due to :    []  Other:      Therapist: Parveen Tobar PTA    Date: 9/12/2017 Time: 3:40 PM     Future Appointments  Date Time Provider Radha Bryson   9/19/2017 1:00 PM [address] [place] [address]   9/22/2017 11:00 AM [address] [place] [address]   9/26/2017 1:00 PM [address] [place] [address]   9/29/2017 11:00 AM [address] [place] [address]

## 2017-09-15 ENCOUNTER — APPOINTMENT (OUTPATIENT)
Dept: PHYSICAL THERAPY | Age: 72
End: 2017-09-15
Payer: MEDICARE

## 2017-09-19 ENCOUNTER — APPOINTMENT (OUTPATIENT)
Dept: PHYSICAL THERAPY | Age: 72
End: 2017-09-19
Payer: MEDICARE

## 2017-09-22 ENCOUNTER — HOSPITAL ENCOUNTER (OUTPATIENT)
Dept: PHYSICAL THERAPY | Age: 72
Discharge: HOME | End: 2017-09-22
Payer: MEDICARE

## 2017-09-22 PROCEDURE — G8978 MOBILITY CURRENT STATUS: HCPCS

## 2017-09-22 PROCEDURE — G8979 MOBILITY GOAL STATUS: HCPCS

## 2017-09-22 PROCEDURE — 97113 AQUATIC THERAPY/EXERCISES: CPT

## 2017-09-22 NOTE — PROGRESS NOTES
PHYSICAL THERAPY - DAILY TREATMENT NOTE - AQUATICS    Patient Name: Cassie Flanagan        Date: 2017  : 1945   YES Patient  Verified  Visit #:   15   of   17  Insurance: Payor: Irving Chirinos / Plan: [employer] / Product Type: Managed Care Medicare /      In time: 11:00 Out time: 11:46   Total Treatment Time: 55     Medicare Time Tracking (below)   Total Timed Codes (min):  46 1:1 Treatment Time:  23     TREATMENT AREA =  Right knee pain [M25.561]    SUBJECTIVE    Pain Level (on 0 to 10 scale):    / 10   Medication Changes/New allergies or changes in medical history, any new surgeries or procedures? NO    If yes, update Summary List   Subjective Functional Status/Changes:  []  No changes reported     Pt reports that her R knee pain increased after walking for about 5 minutes. She ambulates with a SPC inside and outside of her home. Pt states she has a lot of stiffness in her R knee when she gets up from sitting. OBJECTIVE  46  (23) min Therapeutic Exercise:   [x]  Aquatic Therapy   Rationale:      increase strength, increase ROM, and improve balance to improve the patients ability to perform ADL's and ambulate with less pain and increase activity tolerance.       [x]   Patient Education:  Continue Aquatic Therapy and HEP as instructed     UE exercise resistance:                                                  LE exercises:                                                                 [] none                                                                             []  thera-band resistance       [x] small water weights                                                   [] no UE support       [] medium water weights                                              [x]  1 UE support        [] large water weights                                                   [x]  2 UE support          [] back supported on wall       [] thera-band      SLS UE support: [] both UE                          [] 1 UE       [] 1 finger/fingers       []  none       [] EC     Post Treatment Pain Level (on 0 to 10) scale:   3  / 10     Other Mod VCing for posture and form with aquatic ex. FOTO 35/100      GROC: 0     ASSESSMENT    Assessment/Changes in Function:     Pt's FOTO score decreased 7 points since last assessment, but is overall 3 points less than IE indicating minimal change in activity tolerance and correlates to a 65% functional limitation. Mobility [id number] Current  CL= 60-79%   Goal  CK= 40-59%. The severity rating is based on the FOTO Score     [x]  See Progress Note/Recertification   Patient will continue to benefit from skilled PT services to modify and progress therapeutic interventions, address functional mobility deficits, address ROM deficits, address strength deficits, analyze and address soft tissue restrictions, analyze and cue movement patterns, analyze and modify body mechanics/ergonomics and instruct in home and community integration to attain remaining goals     Progress toward goals / Updated goals:    · Goals for this certification period include and are to be achieved in   3-4  weeks:  1. Patient will be independent with HEP to self-manage and prevent symptoms upon DC. Pt requires mod VCing for form with aquatic ex. 2.   Patient will report at least a 3+ on the [facility]FORT HENRIQUE to indicate improved efficiency with ADL's. NOT MET   3. Patient will improve FOTO score by at least 11 points to indicate improved functional status.  NOT MET      PLAN    []  Upgrade activities as tolerated YES Continue plan of care   []  Discharge due to :    []  Other:      Therapist: Radha Duggan PTA    Date: 9/22/2017 Time: 4:34 PM     Future Appointments  Date Time Provider Radha Bryson   9/26/2017 1:00 PM [address] [place] [address]   9/29/2017 11:00 AM [address] [place] [address]

## 2017-09-25 NOTE — PROGRESS NOTES
[address] [facility] PHYSICAL THERAPY  [address] [facility] [address], [address] - Phone: [phone]  Fax: [phone] OF CARE/RECERTIFICATION FOR PHYSICAL THERAPY          Patient Name: Maryanne Queen : 1945   Treatment/Medical Diagnosis: Right knee pain [M25.561]   Onset Date:     Referral Source: Hemant Awad MD Start of Care [facility]): 17   Prior Hospitalization: See Medical History Provider #: [id number]   Prior Level of Function: Independent with ADL's and ambulating without an AD   Comorbidities: OA, increased BMI, depression, bladder prolapse, 2 TKA revisions   Medications: Verified on Patient Summary List   Visits from [facility]: 13 Missed Visits: 8     Goal/Measure of Progress Goal Met? 1.  *Patient will be independent with HEP to self-manage and prevent symptoms upon DC. Status at last Eval: progressing Current Status: Mod vc's with aquatics progressing   2. Patient will report at least a 3+ on the [facility]FORT HENRIQUE to indicate improved efficiency with ADL's. Status at last Eval: NA Current Status: 0 no   3. Patient will improve FOTO score by at least 11 points to indicate improved functional status. Status at last Eval: 42 Current Status: 35 no     Therapy has consisted of 4 additional aquatic visits, since the pt has been away on vacation. Key Functional Changes/Progress:  The pt's FOTO score decreased and the pt still is limited in progress towards goals, except for independence with the aquatics program.    Problem List: pain affecting function, decrease ROM, decrease strength, edema affecting function, impaired gait/ balance, decrease ADL/ functional abilitiies, decrease activity tolerance, decrease flexibility/ joint mobility and decrease transfer abilities   Treatment Plan may include any combination of the following: Therapeutic exercise, Therapeutic activities, Neuromuscular re-education, Physical agent/modality, Gait/balance training, Manual therapy, Aquatic therapy, Patient education, Self Care training, Functional mobility training, Home safety training and Stair training   Goals for this certification period include and are to be achieved in   3-4  weeks:  1. Patient will be independent with her aquatic HEP to self-manage and prevent symptoms upon DC. Frequency / Duration:   Patient to be seen   1-2   times per week for   3-4    weeks:  G-Codes (GP): *[id number] Current  CL= 60-79%   Goal  CK= 40-59%. The severity rating is based on the FOTO Score. Assessments/Recommendations: The patient would benefit from aquatic PT sessions to become independent with a program to perform on her own in the pool to address her symptoms and MMT deficits. If you have any questions/comments please contact us directly at [phone]. Thank you for allowing us to assist in the care of your patient. Therapist Signature: Martin Cabrera DPT Date: 2/48/9284   Certification Period:  Reporting Period: 7/24/17 - 10/27/17  8/17/17 - 9/25/17 Time: 6:49 PM   NOTE TO PHYSICIAN:  PLEASE COMPLETE THE ORDERS BELOW AND FAX TO   [facility]: [phone]. If you are unable to process this request in 24 hours please contact our office: [phone].    ___ I have read the above report and request that my patient continue as recommended.   ___ I have read the above report and request that my patient continue therapy with the following changes/special instructions: ________________________________________________   ___ I have read the above report and request that my patient be discharged from therapy.      Physician Signature:        Date:       Time:

## 2017-09-26 ENCOUNTER — APPOINTMENT (OUTPATIENT)
Dept: PHYSICAL THERAPY | Age: 72
End: 2017-09-26
Payer: MEDICARE

## 2017-09-29 ENCOUNTER — HOSPITAL ENCOUNTER (OUTPATIENT)
Dept: PHYSICAL THERAPY | Age: 72
Discharge: HOME | End: 2017-09-29
Payer: MEDICARE

## 2017-09-29 PROCEDURE — 97113 AQUATIC THERAPY/EXERCISES: CPT

## 2017-09-29 NOTE — PROGRESS NOTES
PHYSICAL THERAPY - DAILY TREATMENT NOTE - AQUATICS    Patient Name: Magdiel Contreras        Date: 2017  : 1945   YES Patient  Verified  Visit #:   15   of   21  Insurance: Payor: Michela Trevizo / Plan: [employer] / Product Type: Managed Care Medicare /      In time: 11:00 Out time: 11:46   Total Treatment Time: 55     Medicare Time Tracking (below)   Total Timed Codes (min):  46 1:1 Treatment Time:  23     TREATMENT AREA =  Right knee pain [M25.561]    SUBJECTIVE    Pain Level (on 0 to 10 scale):  5  / 10   Medication Changes/New allergies or changes in medical history, any new surgeries or procedures? NO    If yes, update Summary List   Subjective Functional Status/Changes:  []  No changes reported     Pt states that she is still having the same swelling in her knee, doesn't seem to be changing. OBJECTIVE  46  (23) min Therapeutic Exercise:   [x]  Aquatic Therapy   Rationale:      increase strength, increase ROM, and improve balance to improve the patients ability to perform ADL's and ambulate with less pain and increase activity tolerance.       [x]   Patient Education:  Continue Aquatic Therapy and HEP as instructed     UE exercise resistance:                                                  LE exercises:                                                                 [] none                                                                             []  thera-band resistance       [] small water weights                                                   [] no UE support       [x] medium water weights                                              [x]  1 UE support        [] large water weights                                                   [x]  2 UE support          [] back supported on wall       [] thera-band      SLS UE support:                      [] both UE                          [] 1 UE       [] 1 finger/fingers       []  none       [] EC     Post Treatment Pain Level (on 0 to 10) scale:   5  / 10     Other  Progressed pt to medium resistance weights with UE ex. Pt challenged with increased resistance and also with balance, but still able to perform ex with proper form. Pt demo's increased speed with amb in water for warm up and cool down laps. ASSESSMENT    Assessment/Changes in Function:     Pt with good tolerance to increased resistance with UE aquatic ex. []  See Progress Note/Recertification   Patient will continue to benefit from skilled PT services to modify and progress therapeutic interventions, address functional mobility deficits, address ROM deficits, address strength deficits, analyze and address soft tissue restrictions, analyze and cue movement patterns, analyze and modify body mechanics/ergonomics and instruct in home and community integration to attain remaining goals     Progress toward goals / Updated goals:    1. Patient will be independent with her aquatic HEP to self-manage and prevent symptoms upon DC Mod VCing for form with aquatic ex.       PLAN    []  Upgrade activities as tolerated YES Continue plan of care   []  Discharge due to :    []  Other:      Therapist: Fahad Kinsey PTA    Date: 9/29/2017 Time: 3:36 PM     Future Appointments  Date Time Provider Radha Bryson   10/2/2017 1:00 PM [address], PT [facility]   10/5/2017 1:00 PM [address], PT [facility]   10/9/2017 1:00 PM [address], PT [facility]   10/12/2017 2:00 PM [address], PT [facility]   10/17/2017 1:00 PM [address] [place] [address]   10/20/2017 11:00 AM [facility]   10/24/2017 1:00 PM [address] [place] [address]   10/27/2017 11:00 AM [address] [place] [address]   10/31/2017 1:00 PM [address] [place] [address]

## 2017-10-02 ENCOUNTER — APPOINTMENT (OUTPATIENT)
Dept: PHYSICAL THERAPY | Age: 72
End: 2017-10-02
Payer: MEDICARE

## 2017-10-05 ENCOUNTER — HOSPITAL ENCOUNTER (OUTPATIENT)
Dept: PHYSICAL THERAPY | Age: 72
Discharge: HOME | End: 2017-10-05
Payer: MEDICARE

## 2017-10-05 PROCEDURE — 97113 AQUATIC THERAPY/EXERCISES: CPT

## 2017-10-05 NOTE — PROGRESS NOTES
PHYSICAL THERAPY - DAILY TREATMENT NOTE - AQUATICS    Patient Name: Becky Guidry        Date: 10/5/2017  : 1945   YES Patient  Verified  Visit #:     Insurance: Payor: Anders Panda / Plan: [employer] / Product Type: Managed Care Medicare /      In time: 100 Out time: 152   Total Treatment Time: 46     Medicare Time Tracking (below)   Total Timed Codes (min):  52 1:1 Treatment Time:  26     TREATMENT AREA =  Right knee pain [M25.561]    SUBJECTIVE    Pain Level (on 0 to 10 scale):  4  / 10   Medication Changes/New allergies or changes in medical history, any new surgeries or procedures? NO    If yes, update Summary List   Subjective Functional Status/Changes:  []  No changes reported     \"i am doing fairly well. I m still having balance issues\"         OBJECTIVE  52(26) min Therapeutic Exercise:   [x]  Aquatic Therapy   Rationale:      increase ROM, increase strength, improve coordination,  and improve balance to improve the patients ability to perform ADL's and increase level of independence.      [x]   Patient Education:  Continue Aquatic Therapy and HEP as instructed     UE exercise resistance:                                                  LE exercises:                                                                 [] none                                                                             []  thera-band resistance       [] small water weights                                                   [] no UE support       [x] medium water weights                                              [x]  1 UE support        [] large water weights                                                   []  2 UE support          [] back supported on wall       [] thera-band      SLS UE support:                      [] both UE                          [x] 1 UE       [] 1 finger/fingers       []  none       [] EC     Post Treatment Pain Level (on 0 to 10) scale:   3 / 10     Other SBA with walks as pt is apprehensive about not being able to swim and losing her balance      ASSESSMENT    Assessment/Changes in Function:   Pt performs well and responds to cueing to avoid over rotation at knee with PNF patterns. Pt performs walks with added floatation at end appears more comfortable     []  See Progress Note/Recertification   Patient will continue to benefit from skilled PT services to modify and progress therapeutic interventions, address functional mobility deficits, address ROM deficits, address strength deficits, analyze and address soft tissue restrictions, analyze and cue movement patterns, analyze and modify body mechanics/ergonomics, assess and modify postural abnormalities, address imbalance/dizziness and instruct in home and community integration to attain remaining goals. to attain remaining goals. Progress toward goals / Updated goals: 1.   Patient will be independent with HEP to self-manage and prevent symptoms upon DC. Pt requires mod VCing for form with aquatic ex. //progressing  2.   Patient will report at least a 3+ on the GROC to indicate improved efficiency with ADL's.  NOT MET //progressing       PLAN    []  Upgrade activities as tolerated YES Continue plan of care   []  Discharge due to :    []  Other:      Therapist: Tabitha Tripp PT    Date: 10/5/2017 Time: 12:36 PM   Future Appointments  Date Time Provider Radha Bryson   10/5/2017 1:00 PM Maribeth Mcdermott PT [facility]   10/9/2017 1:00 PM Maribeth Mcdermott PT [facility]   10/12/2017 2:00 PM Maribeth Mcdermott PT [facility]   10/17/2017 1:00 PM [address] [place] [address]   10/20/2017 11:00 AM [facility]   10/24/2017 1:00 PM [address] [place] [address]   10/27/2017 11:00 AM [facility]   10/31/2017 1:00 PM [address] [place] [address]

## 2017-10-09 ENCOUNTER — APPOINTMENT (OUTPATIENT)
Dept: PHYSICAL THERAPY | Age: 72
End: 2017-10-09
Payer: MEDICARE

## 2017-10-12 ENCOUNTER — HOSPITAL ENCOUNTER (OUTPATIENT)
Dept: PHYSICAL THERAPY | Age: 72
Discharge: HOME | End: 2017-10-12
Payer: MEDICARE

## 2017-10-12 PROCEDURE — 97150 GROUP THERAPEUTIC PROCEDURES: CPT

## 2017-10-12 NOTE — PROGRESS NOTES
PHYSICAL THERAPY - DAILY TREATMENT NOTE - AQUATICS    Patient Name: Sylvie Veloz        Date: 10/12/2017  : 1945   YES Patient  Verified  Visit #:     Insurance: Payor: Misael Mcclelland / Plan: [employer] / Product Type: Managed Care Medicare /      In time: 200 Out time: 255   Total Treatment Time: 54     Medicare Time Tracking (below)   Total Timed Codes (min):  55 1:1 Treatment Time:  1 grp     TREATMENT AREA =  Right knee pain [M25.561]    SUBJECTIVE    Pain Level (on 0 to 10 scale):  3  / 10   Medication Changes/New allergies or changes in medical history, any new surgeries or procedures? NO    If yes, update Summary List   Subjective Functional Status/Changes:  []  No changes reported     \"i get stiffness in knee\"         OBJECTIVE  55 min Therapeutic Exercise:   [x]  Aquatic Therapy   Rationale:      increase ROM, increase strength, improve coordination, proprioception and improve balance to improve the patients ability to perform ADL's and increase level of independence.      [x]   Patient Education:  Continue Aquatic Therapy and HEP as instructed     UE exercise resistance:                                                  LE exercises:                                                                 [] none                                                                             []  thera-band resistance       [x] small water weights                                                   [] no UE support       [] medium water weights                                              []  1 UE support        [] large water weights                                                   [x]  2 UE support          [] back supported on wall       [] thera-band      SLS UE support:                      [x] both UE                          [] 1 UE       [] 1 finger/fingers       []  none       [] EC  Added scapular AROM with rolls x10 in squatted relaxed posture     Post Treatment Pain Level (on 0 to 10) scale:   1 / 10     Other See above     ASSESSMENT    Assessment/Changes in Function:   Moderate VCs for technique of there exs and modify for right knee ROM with hamstring curls to avoid knee strain. []  See Progress Note/Recertification   Patient will continue to benefit from skilled PT services to modify and progress therapeutic interventions, address functional mobility deficits, address ROM deficits, address strength deficits, analyze and address soft tissue restrictions, analyze and cue movement patterns, analyze and modify body mechanics/ergonomics, assess and modify postural abnormalities and instruct in home and community integration to attain remaining goals. to attain remaining goals.    Progress toward goals / Updated goals:  Progressing aquatics indep for there exs     PLAN    []  Upgrade activities as tolerated YES Continue plan of care   []  Discharge due to :    []  Other:      Therapist: Bozena Ontiveros PT    Date: 10/12/2017 Time: 10:59 AM   Future Appointments  Date Time Provider Radha Bryson   10/12/2017 2:00 PM Evon Zavala PT [facility]   10/17/2017 1:00 PM [address] [place] [address]   10/20/2017 11:00 AM [address] [place] [address]   10/24/2017 1:00 PM [address] [place] [address]   10/27/2017 11:00 AM [address] [place] [address]   10/31/2017 1:00 PM [address] [place] [address]

## 2017-10-20 ENCOUNTER — HOSPITAL ENCOUNTER (OUTPATIENT)
Dept: PHYSICAL THERAPY | Age: 72
Discharge: HOME | End: 2017-10-20
Payer: MEDICARE

## 2017-10-20 PROCEDURE — 97150 GROUP THERAPEUTIC PROCEDURES: CPT

## 2017-10-20 NOTE — PROGRESS NOTES
PHYSICAL THERAPY - DAILY TREATMENT NOTE - AQUATICS    Patient Name: Daniel Gomez        Date: 10/20/2017  : 1945   YES Patient  Verified  Visit #:     Insurance: Payor: Marcella Stephen / Plan: [employer] / Product Type: Managed Care Medicare /      In time: 11:00 Out time: 11:46   Total Treatment Time: 55     Medicare Time Tracking (below)   Total Timed Codes (min):  46 1:1 Treatment Time:  0     TREATMENT AREA =  Right knee pain [M25.561]    SUBJECTIVE    Pain Level (on 0 to 10 scale):   10   Medication Changes/New allergies or changes in medical history, any new surgeries or procedures? NO    If yes, update Summary List   Subjective Functional Status/Changes:  []  No changes reported     Pt reports soreness in her R knee today with walking from her car into the wellness center. OBJECTIVE  46  (0) min Therapeutic Exercise:   [x]  Aquatic Therapy   Rationale:      increase strength, increase ROM, and improve balance to improve the patients ability to perform ADL's and ambulate with less pain and increase activity tolerance.       [x]   Patient Education:  Continue Aquatic Therapy and HEP as instructed     UE exercise resistance:                                                  LE exercises:                                                                 [] none                                                                             []  thera-band resistance       [x] small water weights                                                   [] no UE support       [] medium water weights                                              [x]  1 UE support        [] large water weights                                                   [x]  2 UE support          [] back supported on wall       [] thera-band      SLS UE support:                      [] both UE                          [] 1 UE       [] 1 finger/fingers       []  none       [] EC     Post Treatment Pain Level (on 0 to 10) scale:   3  / 10     Other Pt reports no pain or sx's with aquatic ex, but sx's return once she is out of the water and walking on land. Pt requires min VCing for form with aquatic ex. Discussed DC to I aquatic ex program after next week, pt agreeable. ASSESSMENT    Assessment/Changes in Function:     Pt continues with c/o R knee pain that limits her activity tolerance. Pt is approaching I with aquatic ex program.      []  See Progress Note/Recertification   Patient will continue to benefit from skilled PT services to modify and progress therapeutic interventions, address functional mobility deficits, address ROM deficits, address strength deficits, analyze and address soft tissue restrictions, analyze and cue movement patterns, analyze and modify body mechanics/ergonomics, assess and modify postural abnormalities and instruct in home and community integration to attain remaining goals. Progress toward goals / Updated goals:    Pt is approaching I with aquatic ex program     PLAN    []  Upgrade activities as tolerated YES Continue plan of care   []  Discharge due to :    [x]  Other: DC to I aquatic program in 2 visits.       Therapist: Rhina Rangel PTA    Date: 10/20/2017 Time: 3:54 PM     Future Appointments  Date Time Provider Radha Bryson   10/24/2017 1:00 PM [address] [place] [address]   10/27/2017 11:00 AM [address] [place] [address]

## 2017-10-24 ENCOUNTER — HOSPITAL ENCOUNTER (OUTPATIENT)
Dept: PHYSICAL THERAPY | Age: 72
Discharge: HOME | End: 2017-10-24
Payer: MEDICARE

## 2017-10-24 PROCEDURE — 97113 AQUATIC THERAPY/EXERCISES: CPT

## 2017-10-24 NOTE — PROGRESS NOTES
PHYSICAL THERAPY - DAILY TREATMENT NOTE - AQUATICS    Patient Name: Titi Flannery        Date: 10/24/2017  : 1945   YES Patient  Verified  Visit #:   25   of   21-15  Insurance: Payor: Alem Bundy / Plan: [employer] / Product Type: Managed Care Medicare /      In time: 1:00 Out time: 1:48   Total Treatment Time: 48     Medicare Time Tracking (below)   Total Timed Codes (min):  48 1:1 Treatment Time:  24     TREATMENT AREA =  Right knee pain [M25.561]    SUBJECTIVE    Pain Level (on 0 to 10 scale):  4  / 10   Medication Changes/New allergies or changes in medical history, any new surgeries or procedures? NO    If yes, update Summary List   Subjective Functional Status/Changes:  []  No changes reported     Pt states that she has been looking into joining a facility with a pool to keep up the water therapy. OBJECTIVE  48  (24) min Therapeutic Exercise:   [x]  Aquatic Therapy   Rationale:      increase strength, increase ROM, and improve balance to improve the patients ability to perform ADL's and ambulate with less pain and increase activity tolerance.       [x]   Patient Education:  Continue Aquatic Therapy and HEP as instructed     UE exercise resistance:                                                  LE exercises:                                                                 [] none                                                                             []  thera-band resistance       [] small water weights                                                   [] no UE support       [x] medium water weights                                              [x]  1 UE support        [] large water weights                                                   [x]  2 UE support          [] back supported on wall       [] thera-band      SLS UE support:                      [] both UE                          [] 1 UE       [] 1 finger/fingers       []  none       [] EC     Post Treatment Pain Level (on 0 to 10) scale:   2  / 10     Other  Pt's balance challenged with use of medium resistance weights with UE ex, but pt still able to perform ex with proper form. Pt with reports of decreased pain level at end of session. Discussed DC planning for [place] to I aquatic program, pt agreeable. Provided pt HO of pool facilities and list of aquatic ex's. ASSESSMENT    Assessment/Changes in Function:     Pt consistently reporting decreased pain level after last 4 aquatic sessions. []  See Progress Note/Recertification   Patient will continue to benefit from skilled PT services to modify and progress therapeutic interventions, address functional mobility deficits, address ROM deficits, address strength deficits, analyze and address soft tissue restrictions, analyze and cue movement patterns, analyze and modify body mechanics/ergonomics, assess and modify postural abnormalities and instruct in home and community integration to attain remaining goals.      Progress toward goals / Updated goals:    Pt is progressing toward I with aquatic program.      PLAN    []  Upgrade activities as tolerated YES Continue plan of care   []  Discharge due to :    [x]  Other: DC [place]     Therapist: Sarah Washington PTA    Date: 10/24/2017 Time: 3:38 PM     Future Appointments  Date Time Provider Radha Bryson   10/27/2017 11:00 AM [address] [place] [address]

## 2017-10-27 ENCOUNTER — HOSPITAL ENCOUNTER (OUTPATIENT)
Dept: PHYSICAL THERAPY | Age: 72
Discharge: HOME | End: 2017-10-27
Payer: MEDICARE

## 2017-10-27 PROCEDURE — 97113 AQUATIC THERAPY/EXERCISES: CPT

## 2017-10-27 PROCEDURE — G8980 MOBILITY D/C STATUS: HCPCS

## 2017-10-27 PROCEDURE — G8979 MOBILITY GOAL STATUS: HCPCS

## 2017-10-27 NOTE — PROGRESS NOTES
[address] [facility] PHYSICAL THERAPY  [address] [facility] [place], [address] - Phone: [phone]  Fax: [phone]  DISCHARGE SUMMARY FOR PHYSICAL THERAPY          Patient Name: Becky Guidry : 1945   Treatment/Medical Diagnosis: Right knee pain [M25.561]   Onset Date:     Referral Source: Naa Avila MD [facility]): 17   Prior Hospitalization: See Medical History Provider #: [id number]   Prior Level of Function: Independent with ADL's and ambulating without an AD   Comorbidities: OA, increased BMI, depression, bladder prolapse, 2 TKA revisions   Medications: Verified on Patient Summary List   Vis[facility] from [facility]: 19 Missed Visits: 9     Goal/Measure of Progress Goal Met? 1. Patient will be independent with her aquatic HEP to self-manage and prevent symptoms upon DC. Status at last Eval: progressing Current Status: indpendent yes     Key Functional Changes/Progress: Pt's FOTO score now 34 indicating 66% limitation in functional ability. G-Codes (GP): Mobility  [id number] Goal  CK= 40-59% [id number] D/C  CL= 60-79%. The severity rating is based on the FOTO Score  Assessments/Recommendations: Discontinue therapy. Progressing towards or have reached established goals. To ensure we are able to process the patients encounter and avoid risk of your patient receiving a bill for our services, please sign and return this discharge summary by 17. (30days following DC date)    If you have any questions/comments please contact us directly at [phone]. Thank you for allowing us to assist in the care of your patient. Therapist Signature: Conrado Vivar DPT Date: 10/27/2017    Reporting Period: 17 - 10/27/17 Time: 5:53 PM     ===========================================================================================  NOTE TO PHYSICIAN:  PLEASE COMPLETE THE ORDERS BELOW AND FAX TO   [facility]: [phone].   If you are unable to process this request in 24 hours please contact our office: [phone].    ___ I have read the above report and request that my patient continue as recommended.   ___ I have read the above report and request that my patient continue therapy with the following changes/special instructions: ________________________________________________   ___ I have read the above report and request that my patient be discharged from therapy.      Physician Signature:        Date:       Time:

## 2017-10-27 NOTE — PROGRESS NOTES
PHYSICAL THERAPY - DAILY TREATMENT NOTE - AQUATICS    Patient Name: Cindy Navarro        Date: 10/27/2017  : 1945   YES Patient  Verified  Visit #:     Insurance: Payor: Gilmer Weems / Plan: [employer] / Product Type: Managed Care Medicare /      In time: 11:00 Out time: 11:46   Total Treatment Time: 55     Medicare Time Tracking (below)   Total Timed Codes (min):  46 1:1 Treatment Time: 46     TREATMENT AREA =  Right knee pain [M25.561]    SUBJECTIVE    Pain Level (on 0 to 10 scale):   10   Medication Changes/New allergies or changes in medical history, any new surgeries or procedures? NO    If yes, update Summary List   Subjective Functional Status/Changes:  []  No changes reported     Pt reports that she plans to keep up the water therapy on her own. Pt states that her knee still gets stiff a lot. OBJECTIVE  46 min Therapeutic Exercise:   [x]  Aquatic Therapy   Rationale:      increase strength, increase ROM, and improve balance to improve the patients ability to perform ADL's and ambulate with less pain and increase activity tolerance.       [x]   Patient Education:  Continue Aquatic Therapy and HEP as instructed     UE exercise resistance:                                                  LE exercises:                                                                 [] none                                                                             []  thera-band resistance       [] small water weights                                                   [] no UE support       [x] medium water weights                                              [x]  1 UE support        [] large water weights                                                   [x]  2 UE support          [] back supported on wall       [] thera-band      SLS UE support:                      [] both UE                          [] 1 UE       [] 1 finger/fingers       [] none       [] EC     Post Treatment Pain Level (on 0 to 10) scale:   3  / 10     Other  Pt demo's I with aquatic ex program.     FOTO 34/100       ASSESSMENT    Assessment/Changes in Function:     Pt's FOTO score decreased 1 point since lat assessment indicating pt is a limited community ambulator and correlates to a 66% functional limitation. Mobility   Goal  CK= 40-59%  D/C  CL= 60-79%. The severity rating is based on the FOTO Score     [x]  See Progress Note/Recertification   Patient will continue to benefit from skilled PT services to NA-See DC Summary     Progress toward goals / Updated goals:    1. Patient will be independent with her aquatic HEP to self-manage and prevent symptoms upon DC. MET     PLAN    []  Upgrade activities as tolerated NA Continue plan of care   [x]  Discharge due to : Pt I with aquatics. []  Other:      Therapist: Carlitos Ellis PTA    Date: 10/27/2017 Time: 4:14 PM     No future appointments.

## 2017-10-31 ENCOUNTER — APPOINTMENT (OUTPATIENT)
Dept: PHYSICAL THERAPY | Age: 72
End: 2017-10-31
Payer: MEDICARE

## 2018-10-10 ENCOUNTER — HOSPITAL ENCOUNTER (OUTPATIENT)
Dept: LAB | Age: 73
Discharge: HOME | End: 2018-10-10
Payer: MEDICARE

## 2018-10-10 DIAGNOSIS — Z96.651 PRESENCE OF RIGHT ARTIFICIAL KNEE JOINT: ICD-10-CM

## 2018-10-10 LAB
BASOPHILS # BLD: 0 K/UL (ref 0–0.1)
BASOPHILS NFR BLD: 1 % (ref 0–2)
CRP SERPL-MCNC: 0.7 MG/DL (ref 0–0.3)
DIFFERENTIAL METHOD BLD: ABNORMAL
EOSINOPHIL # BLD: 0.1 K/UL (ref 0–0.4)
EOSINOPHIL NFR BLD: 3 % (ref 0–5)
ERYTHROCYTE [DISTWIDTH] IN BLOOD BY AUTOMATED COUNT: 14.3 % (ref 11.6–14.5)
ERYTHROCYTE [SEDIMENTATION RATE] IN BLOOD: 34 MM/HR (ref 0–30)
HCT VFR BLD AUTO: 37.2 % (ref 35–45)
HGB BLD-MCNC: 11.2 G/DL (ref 12–16)
LYMPHOCYTES # BLD: 1.5 K/UL (ref 0.9–3.6)
LYMPHOCYTES NFR BLD: 31 % (ref 21–52)
MCH RBC QN AUTO: 26.2 PG (ref 24–34)
MCHC RBC AUTO-ENTMCNC: 30.1 G/DL (ref 31–37)
MCV RBC AUTO: 86.9 FL (ref 74–97)
MONOCYTES # BLD: 0.6 K/UL (ref 0.05–1.2)
MONOCYTES NFR BLD: 12 % (ref 3–10)
NEUTS SEG # BLD: 2.6 K/UL (ref 1.8–8)
NEUTS SEG NFR BLD: 53 % (ref 40–73)
PLATELET # BLD AUTO: 254 K/UL (ref 135–420)
PMV BLD AUTO: 14.8 FL (ref 9.2–11.8)
RBC # BLD AUTO: 4.28 M/UL (ref 4.2–5.3)
WBC # BLD AUTO: 4.9 K/UL (ref 4.6–13.2)

## 2018-10-10 PROCEDURE — 86140 C-REACTIVE PROTEIN: CPT | Performed by: ORTHOPAEDIC SURGERY

## 2018-10-10 PROCEDURE — 36415 COLL VENOUS BLD VENIPUNCTURE: CPT | Performed by: ORTHOPAEDIC SURGERY

## 2018-10-10 PROCEDURE — 85025 COMPLETE CBC W/AUTO DIFF WBC: CPT | Performed by: ORTHOPAEDIC SURGERY

## 2018-10-10 PROCEDURE — 85652 RBC SED RATE AUTOMATED: CPT | Performed by: ORTHOPAEDIC SURGERY

## 2024-04-23 ENCOUNTER — OFFICE VISIT (OUTPATIENT)
Age: 79
End: 2024-04-23
Payer: MEDICARE

## 2024-04-23 VITALS
DIASTOLIC BLOOD PRESSURE: 54 MMHG | HEART RATE: 86 BPM | WEIGHT: 208 LBS | TEMPERATURE: 98.2 F | OXYGEN SATURATION: 94 % | SYSTOLIC BLOOD PRESSURE: 89 MMHG

## 2024-04-23 DIAGNOSIS — R06.02 EXERTIONAL SHORTNESS OF BREATH: Primary | ICD-10-CM

## 2024-04-23 DIAGNOSIS — I50.42 CHRONIC COMBINED SYSTOLIC AND DIASTOLIC CHF (CONGESTIVE HEART FAILURE) (HCC): ICD-10-CM

## 2024-04-23 DIAGNOSIS — I44.7 LEFT BUNDLE BRANCH BLOCK (LBBB): ICD-10-CM

## 2024-04-23 DIAGNOSIS — I35.9 AORTIC VALVE DISEASE: ICD-10-CM

## 2024-04-23 DIAGNOSIS — R94.31 ABNORMAL ECG: ICD-10-CM

## 2024-04-23 DIAGNOSIS — I42.0 CONGESTIVE CARDIOMYOPATHY (HCC): ICD-10-CM

## 2024-04-23 DIAGNOSIS — I48.0 PAROXYSMAL ATRIAL FIBRILLATION (HCC): ICD-10-CM

## 2024-04-23 DIAGNOSIS — R01.1 SYSTOLIC MURMUR: ICD-10-CM

## 2024-04-23 DIAGNOSIS — Z79.01 LONG TERM (CURRENT) USE OF ANTICOAGULANTS: ICD-10-CM

## 2024-04-23 DIAGNOSIS — I26.93 SINGLE SUBSEGMENTAL PULMONARY EMBOLISM WITHOUT ACUTE COR PULMONALE (HCC): ICD-10-CM

## 2024-04-23 DIAGNOSIS — I10 PRIMARY HYPERTENSION: ICD-10-CM

## 2024-04-23 PROCEDURE — 1123F ACP DISCUSS/DSCN MKR DOCD: CPT | Performed by: INTERNAL MEDICINE

## 2024-04-23 PROCEDURE — 3078F DIAST BP <80 MM HG: CPT | Performed by: INTERNAL MEDICINE

## 2024-04-23 PROCEDURE — 99215 OFFICE O/P EST HI 40 MIN: CPT | Performed by: INTERNAL MEDICINE

## 2024-04-23 PROCEDURE — 3074F SYST BP LT 130 MM HG: CPT | Performed by: INTERNAL MEDICINE

## 2024-04-23 RX ORDER — DULOXETINE 40 MG/1
40 CAPSULE, DELAYED RELEASE ORAL DAILY
COMMUNITY
Start: 2024-04-11

## 2024-04-23 RX ORDER — PREGABALIN 75 MG/1
75 CAPSULE ORAL 2 TIMES DAILY
COMMUNITY
Start: 2024-04-20

## 2024-04-23 RX ORDER — DAPAGLIFLOZIN 10 MG/1
10 TABLET, FILM COATED ORAL DAILY
COMMUNITY
Start: 2024-03-22

## 2024-04-23 RX ORDER — AZILSARTAN KAMEDOXOMIL AND CHLORTHALIDONE 40; 25 MG/1; MG/1
1 TABLET ORAL DAILY
COMMUNITY
Start: 2024-04-03

## 2024-04-23 ASSESSMENT — ENCOUNTER SYMPTOMS
EYES NEGATIVE: 1
ALLERGIC/IMMUNOLOGIC NEGATIVE: 1
GASTROINTESTINAL NEGATIVE: 1
SHORTNESS OF BREATH: 1

## 2024-04-23 NOTE — PROGRESS NOTES
Identified pt with two pt identifiers(name and ). Reviewed record in preparation for visit and have obtained necessary documentation.    Hayley Woodson presents today for   Chief Complaint   Patient presents with    Follow-up       Pt c/o DIZZINESS, SOB, CHEST PRESSURE, FATIGUE, SWELLING.             Hayley Woodson preferred language for health care discussion is english/other.    Personal Protective Equipment:   Personal Protective Equipment was used including: mask-surgical and hands-gloves. Patient was placed on no precaution(s). Patient was not masked.    Precautions:   Patient currently on None  Patient currently roomed with door closed.    Is someone accompanying this pt? no    Is the patient using any DME equipment during OV? cane    Depression Screening:       No data to display                 Learning Assessment:  No question data found.    Abuse Screening:       No data to display                   Fall Risk       No data to display                  Pt currently taking Anticoagulant /Antiplatelet therapy? no    Coordination of Care:  1. Have you been to the ER, urgent care clinic since your last visit? Hospitalized since your last visit? no    2. Have you seen or consulted any other health care providers outside of the [facility] since your last visit? Include any pap smears or colon screening. no      Please see Red banners under Allergies and Med Rec to remove outside inquires. All correct information has been verified with patient and added to chart.     Medication's patient's would liked removed has been marked not taking to be removed per Verbal order and read back per Bang Finn MD  
    No Known Allergies     Current Outpatient Medications   Medication Sig Dispense Refill    FARXIGA 10 MG tablet Take 1 tablet by mouth daily      EDARBYCLOR 40-25 MG TABS Take 1 tablet by mouth daily      DULoxetine HCl 40 MG CPEP Take 40 capsules by mouth daily      pregabalin (LYRICA) 75 MG capsule Take 1 capsule by mouth 2 times daily.       No current facility-administered medications for this visit.        Social History     Tobacco Use    Smoking status: Never     Passive exposure: Never    Smokeless tobacco: Never   Substance Use Topics    Alcohol use: Never    Drug use: Never        No family history on file.     Review of Systems   Constitutional: Negative.    HENT: Negative.     Eyes: Negative.    Respiratory:  Positive for shortness of breath.    Cardiovascular:  Negative for chest pain and palpitations.   Gastrointestinal: Negative.    Endocrine: Negative.    Genitourinary: Negative.    Allergic/Immunologic: Negative.    Neurological: Negative.    Hematological: Negative.    Psychiatric/Behavioral: Negative.       Physical Exam  Vitals and nursing note reviewed.   Constitutional:       Appearance: Normal appearance.   HENT:      Head: Normocephalic and atraumatic.      Right Ear: External ear normal.      Left Ear: External ear normal.      Nose: Nose normal.      Mouth/Throat:      Mouth: Mucous membranes are dry.      Pharynx: Oropharynx is clear.   Eyes:      Extraocular Movements: Extraocular movements intact.      Conjunctiva/sclera: Conjunctivae normal.      Pupils: Pupils are equal, round, and reactive to light.   Neck:      Thyroid: No thyroid mass.      Vascular: No carotid bruit or JVD.      Trachea: Trachea normal.   Cardiovascular:      Rate and Rhythm: Normal rate and regular rhythm.      Pulses:           Carotid pulses are 1+ on the right side and 1+ on the left side.       Radial pulses are 1+ on the right side and 1+ on the left side.        Femoral pulses are 1+ on the right side and

## 2024-05-07 ENCOUNTER — OFFICE VISIT (OUTPATIENT)
Age: 79
End: 2024-05-07
Payer: MEDICARE

## 2024-05-07 VITALS
SYSTOLIC BLOOD PRESSURE: 148 MMHG | RESPIRATION RATE: 20 BRPM | DIASTOLIC BLOOD PRESSURE: 77 MMHG | HEART RATE: 58 BPM | TEMPERATURE: 98.6 F | OXYGEN SATURATION: 98 % | WEIGHT: 207 LBS

## 2024-05-07 DIAGNOSIS — R06.02 EXERTIONAL SHORTNESS OF BREATH: ICD-10-CM

## 2024-05-07 DIAGNOSIS — I10 PRIMARY HYPERTENSION: Primary | ICD-10-CM

## 2024-05-07 PROCEDURE — 3078F DIAST BP <80 MM HG: CPT | Performed by: INTERNAL MEDICINE

## 2024-05-07 PROCEDURE — 99211 OFF/OP EST MAY X REQ PHY/QHP: CPT | Performed by: INTERNAL MEDICINE

## 2024-05-07 PROCEDURE — 3075F SYST BP GE 130 - 139MM HG: CPT | Performed by: INTERNAL MEDICINE

## 2024-05-07 RX ORDER — ZOLEDRONIC ACID 5 MG/100ML
5 INJECTION, SOLUTION INTRAVENOUS ONCE
COMMUNITY

## 2024-05-07 RX ORDER — FLUTICASONE PROPIONATE 50 MCG
1 SPRAY, SUSPENSION (ML) NASAL DAILY
COMMUNITY
Start: 2020-07-15

## 2024-05-07 RX ORDER — ATORVASTATIN CALCIUM 10 MG/1
10 TABLET, FILM COATED ORAL NIGHTLY
COMMUNITY

## 2024-05-07 RX ORDER — OXYCODONE AND ACETAMINOPHEN 7.5; 325 MG/1; MG/1
1 TABLET ORAL EVERY 8 HOURS PRN
COMMUNITY

## 2024-05-07 RX ORDER — PREDNISONE 5 MG/1
5 TABLET ORAL DAILY PRN
COMMUNITY

## 2024-05-07 RX ORDER — LORATADINE 10 MG/1
1 TABLET ORAL DAILY
COMMUNITY
Start: 2022-03-25

## 2024-05-07 RX ORDER — SENNOSIDES 8.6 MG
650 CAPSULE ORAL EVERY 8 HOURS PRN
COMMUNITY

## 2024-05-07 RX ORDER — BISACODYL 5 MG/1
10 TABLET, DELAYED RELEASE ORAL DAILY PRN
COMMUNITY
Start: 2015-09-13

## 2024-05-07 RX ORDER — SACUBITRIL AND VALSARTAN 49; 51 MG/1; MG/1
1 TABLET, FILM COATED ORAL 2 TIMES DAILY
COMMUNITY
End: 2024-05-07 | Stop reason: SDUPTHER

## 2024-05-07 RX ORDER — POLYETHYLENE GLYCOL 3350 17 G/17G
17 POWDER, FOR SOLUTION ORAL DAILY
COMMUNITY

## 2024-05-07 RX ORDER — SACUBITRIL AND VALSARTAN 24; 26 MG/1; MG/1
1 TABLET, FILM COATED ORAL 2 TIMES DAILY
COMMUNITY
End: 2024-05-07 | Stop reason: DRUGHIGH

## 2024-05-07 NOTE — PROGRESS NOTES
Patient comes in today for BP check, due to her being Hypotensive at her last visit. Medication changes made at that visit.  Meds reviewed and updated.  Patient said she is feeling better and not as short of breath as before.     Reviewed visit with Dr. Finn and new orders received.  -Increase Entresto to 49/51 mg bid  -Have patient keep f/u appointment.  -She is to call the office if she has any problems.    Patient discharged, and provided with an updated med list.   A new encounter will be placed so the New Entresto dosage can be sent to the pharmacy. Patient is aware that she can finish the 24/26 mg Entresto that she has and then start the new dosage.

## 2024-05-07 NOTE — TELEPHONE ENCOUNTER
PCP: Alber Finn II, MD    Last appt:  5/7/2024   Future Appointments   Date Time Provider Department Center   10/16/2024  8:30 AM BS [facility]   10/23/2024 11:30 AM Bang Finn Sr., MD [facility]       Requested Prescriptions     Pending Prescriptions Disp Refills    sacubitril-valsartan (ENTRESTO) 49-51 MG per tablet 60 tablet 11     Sig: Take 1 tablet by mouth 2 times daily       Request for a 30 day supply? Provider Discretion    Pharmacy: Local pharmacy    [facility]    Other Comments:

## 2024-05-08 RX ORDER — SACUBITRIL AND VALSARTAN 49; 51 MG/1; MG/1
1 TABLET, FILM COATED ORAL 2 TIMES DAILY
Qty: 60 TABLET | Refills: 11 | Status: SHIPPED | OUTPATIENT
Start: 2024-05-08

## 2024-09-18 RX ORDER — DAPAGLIFLOZIN 10 MG/1
10 TABLET, FILM COATED ORAL DAILY
Qty: 30 TABLET | Refills: 11 | Status: SHIPPED | OUTPATIENT
Start: 2024-09-18

## 2024-10-09 DIAGNOSIS — R06.02 SHORTNESS OF BREATH: Primary | ICD-10-CM

## 2024-10-23 ENCOUNTER — OFFICE VISIT (OUTPATIENT)
Age: 79
End: 2024-10-23
Payer: MEDICARE

## 2024-10-23 ENCOUNTER — TELEPHONE (OUTPATIENT)
Age: 79
End: 2024-10-23

## 2024-10-23 VITALS
TEMPERATURE: 97.6 F | SYSTOLIC BLOOD PRESSURE: 139 MMHG | BODY MASS INDEX: 34.49 KG/M2 | WEIGHT: 214.6 LBS | DIASTOLIC BLOOD PRESSURE: 67 MMHG | OXYGEN SATURATION: 96 % | HEIGHT: 66 IN | HEART RATE: 64 BPM

## 2024-10-23 DIAGNOSIS — I10 PRIMARY HYPERTENSION: ICD-10-CM

## 2024-10-23 DIAGNOSIS — I48.0 PAROXYSMAL ATRIAL FIBRILLATION (HCC): ICD-10-CM

## 2024-10-23 DIAGNOSIS — R01.1 SYSTOLIC MURMUR: ICD-10-CM

## 2024-10-23 DIAGNOSIS — R06.02 EXERTIONAL SHORTNESS OF BREATH: Primary | ICD-10-CM

## 2024-10-23 DIAGNOSIS — I50.42 CHRONIC COMBINED SYSTOLIC AND DIASTOLIC CHF (CONGESTIVE HEART FAILURE) (HCC): ICD-10-CM

## 2024-10-23 DIAGNOSIS — Z79.01 LONG TERM (CURRENT) USE OF ANTICOAGULANTS: ICD-10-CM

## 2024-10-23 DIAGNOSIS — I26.93 SINGLE SUBSEGMENTAL PULMONARY EMBOLISM WITHOUT ACUTE COR PULMONALE (HCC): ICD-10-CM

## 2024-10-23 DIAGNOSIS — R94.31 ABNORMAL ECG: ICD-10-CM

## 2024-10-23 DIAGNOSIS — I35.9 AORTIC VALVE DISEASE: ICD-10-CM

## 2024-10-23 DIAGNOSIS — I44.7 LEFT BUNDLE BRANCH BLOCK (LBBB): ICD-10-CM

## 2024-10-23 DIAGNOSIS — I42.0 CONGESTIVE CARDIOMYOPATHY (HCC): ICD-10-CM

## 2024-10-23 PROCEDURE — 3078F DIAST BP <80 MM HG: CPT | Performed by: INTERNAL MEDICINE

## 2024-10-23 PROCEDURE — 3075F SYST BP GE 130 - 139MM HG: CPT | Performed by: INTERNAL MEDICINE

## 2024-10-23 PROCEDURE — 1123F ACP DISCUSS/DSCN MKR DOCD: CPT | Performed by: INTERNAL MEDICINE

## 2024-10-23 PROCEDURE — 1159F MED LIST DOCD IN RCRD: CPT | Performed by: INTERNAL MEDICINE

## 2024-10-23 PROCEDURE — 1126F AMNT PAIN NOTED NONE PRSNT: CPT | Performed by: INTERNAL MEDICINE

## 2024-10-23 PROCEDURE — 99215 OFFICE O/P EST HI 40 MIN: CPT | Performed by: INTERNAL MEDICINE

## 2024-10-23 RX ORDER — LINACLOTIDE 290 UG/1
1 CAPSULE, GELATIN COATED ORAL DAILY
COMMUNITY
Start: 2024-10-09

## 2024-10-23 RX ORDER — TRAZODONE HYDROCHLORIDE 50 MG/1
TABLET, FILM COATED ORAL
COMMUNITY
Start: 2024-08-02

## 2024-10-23 RX ORDER — CLOTRIMAZOLE AND BETAMETHASONE DIPROPIONATE 10; .64 MG/G; MG/G
CREAM TOPICAL
COMMUNITY
Start: 2024-08-09

## 2024-10-23 RX ORDER — ESOMEPRAZOLE MAGNESIUM 40 MG/1
CAPSULE, DELAYED RELEASE ORAL
COMMUNITY
Start: 2024-09-16

## 2024-10-23 RX ORDER — ATORVASTATIN CALCIUM 20 MG/1
20 TABLET, FILM COATED ORAL DAILY
COMMUNITY
Start: 2024-07-29

## 2024-10-23 ASSESSMENT — ENCOUNTER SYMPTOMS
EYES NEGATIVE: 1
ALLERGIC/IMMUNOLOGIC NEGATIVE: 1
GASTROINTESTINAL NEGATIVE: 1
SHORTNESS OF BREATH: 0

## 2024-10-23 ASSESSMENT — PATIENT HEALTH QUESTIONNAIRE - PHQ9
2. FEELING DOWN, DEPRESSED OR HOPELESS: NOT AT ALL
SUM OF ALL RESPONSES TO PHQ QUESTIONS 1-9: 0
1. LITTLE INTEREST OR PLEASURE IN DOING THINGS: NOT AT ALL
SUM OF ALL RESPONSES TO PHQ9 QUESTIONS 1 & 2: 0
SUM OF ALL RESPONSES TO PHQ QUESTIONS 1-9: 0

## 2024-10-23 NOTE — TELEPHONE ENCOUNTER
Per Dr. Finn call the pt on Friday 10/25/2024 and get her home blood pressure readings, so he can decide if he needs to go up on her entresto.    She is currently on entresto 49-51 mg (1) tablet twice a day.

## 2024-10-23 NOTE — PROGRESS NOTES
1. \"Have you been to the ER, urgent care clinic since your last visit?  Hospitalized since your last visit?\" Reviewed by Dr. Bang Finn    2. \"Have you seen or consulted any other health care providers outside of the [facility] since your last visit?\" Reviewed by Dr. Bang Finn

## 2024-10-23 NOTE — PROGRESS NOTES
Hayley Woodson (:  1945) is a 78 y.o. female,Established patient, here for evaluation of the following chief complaint(s):  6 Month Follow-Up    Subjective   SUBJECTIVE/OBJECTIVE:    Patient presents today for follow-up. She has a history of congestive heart failure, shortness of breath and paroxysmal atrial fibrillation. She also has a history of DVT and pulmonary embolus approximately 3 to 4 years ago and was started on Coumadin. After discharge, she was changed to Xarelto, but had recurrent DVT while taking that medication. She was not on any anticoagulation, but is now back on Xarelto. She has a history of bilateral knee pain and underwent bilateral knee replacement with Dr. Lujan. She has a history of DVT in the past and apparently underwent placement of a [place] filter prior to surgery.           Today, she is doing fair.  When I saw her last, she was having more more shortness of breath.  Because of her history of diastolic heart failure, I placed her on Farxiga and she did quite well with that initially but over the last few weeks, she has become short of breath again.  It is not as severe as noted previously but is more problematic than after the initiation of the medication.  She states that walking 20 to 30 yards elicits the shortness of breath.  No orthopnea at present.  No paroxysmal nocturnal dyspnea.  Her last echocardiogram did show reduction in heart function to 35% which is indicative of combined systolic and diastolic heart failure.  She does have some dysphagia and has not been eating or drinking as well as noted previously and as a result, blood pressures are somewhat low. She does have an aortic valve disorder. No evidence of stenosis, however.    History of Present Illness  The patient is a 78-year-old female who presents for evaluation of multiple medical concerns.    She reports experiencing memory issues and is under the care of Dr. Horton, a neurologist. An MRI was

## 2024-10-25 ENCOUNTER — TELEPHONE (OUTPATIENT)
Age: 79
End: 2024-10-25

## 2024-10-25 NOTE — TELEPHONE ENCOUNTER
Called and spoke with Hayley Woodson, two pt identifiers verified, name and .     Asked for home bp readings below:    10/25/2024  128/83  Heart Rate 74     She states,\" I only have one blood pressure reading. I wasn't sure if mine was accurate, so I went somewhere to have it done.\"    Asked her to take her blood pressure over the weekend and report on Monday, what they are.    Discussed with ZONIA Woodson voiced understanding.

## 2025-02-26 ENCOUNTER — OFFICE VISIT (OUTPATIENT)
Age: 80
End: 2025-02-26
Payer: MEDICARE

## 2025-02-26 VITALS
BODY MASS INDEX: 33.97 KG/M2 | TEMPERATURE: 97.6 F | OXYGEN SATURATION: 94 % | WEIGHT: 211.4 LBS | HEART RATE: 63 BPM | SYSTOLIC BLOOD PRESSURE: 131 MMHG | HEIGHT: 66 IN | DIASTOLIC BLOOD PRESSURE: 67 MMHG

## 2025-02-26 DIAGNOSIS — I50.42 CHRONIC COMBINED SYSTOLIC AND DIASTOLIC CHF (CONGESTIVE HEART FAILURE) (HCC): ICD-10-CM

## 2025-02-26 DIAGNOSIS — I48.0 PAROXYSMAL ATRIAL FIBRILLATION (HCC): ICD-10-CM

## 2025-02-26 DIAGNOSIS — I42.0 CONGESTIVE CARDIOMYOPATHY (HCC): ICD-10-CM

## 2025-02-26 DIAGNOSIS — R06.02 EXERTIONAL SHORTNESS OF BREATH: Primary | ICD-10-CM

## 2025-02-26 DIAGNOSIS — R01.1 SYSTOLIC MURMUR: ICD-10-CM

## 2025-02-26 DIAGNOSIS — I10 PRIMARY HYPERTENSION: ICD-10-CM

## 2025-02-26 DIAGNOSIS — I26.93 SINGLE SUBSEGMENTAL PULMONARY EMBOLISM WITHOUT ACUTE COR PULMONALE (HCC): ICD-10-CM

## 2025-02-26 DIAGNOSIS — Z79.01 LONG TERM (CURRENT) USE OF ANTICOAGULANTS: ICD-10-CM

## 2025-02-26 DIAGNOSIS — I44.7 LEFT BUNDLE BRANCH BLOCK (LBBB): ICD-10-CM

## 2025-02-26 DIAGNOSIS — R94.31 ABNORMAL ECG: ICD-10-CM

## 2025-02-26 DIAGNOSIS — I35.9 AORTIC VALVE DISEASE: ICD-10-CM

## 2025-02-26 PROCEDURE — 1160F RVW MEDS BY RX/DR IN RCRD: CPT | Performed by: INTERNAL MEDICINE

## 2025-02-26 PROCEDURE — 3075F SYST BP GE 130 - 139MM HG: CPT | Performed by: INTERNAL MEDICINE

## 2025-02-26 PROCEDURE — 3078F DIAST BP <80 MM HG: CPT | Performed by: INTERNAL MEDICINE

## 2025-02-26 PROCEDURE — 1159F MED LIST DOCD IN RCRD: CPT | Performed by: INTERNAL MEDICINE

## 2025-02-26 PROCEDURE — 1126F AMNT PAIN NOTED NONE PRSNT: CPT | Performed by: INTERNAL MEDICINE

## 2025-02-26 PROCEDURE — 1123F ACP DISCUSS/DSCN MKR DOCD: CPT | Performed by: INTERNAL MEDICINE

## 2025-02-26 PROCEDURE — 99215 OFFICE O/P EST HI 40 MIN: CPT | Performed by: INTERNAL MEDICINE

## 2025-02-26 RX ORDER — SPIRONOLACTONE 25 MG/1
25 TABLET ORAL DAILY
Qty: 90 TABLET | Refills: 3 | Status: SHIPPED | OUTPATIENT
Start: 2025-02-26

## 2025-02-26 RX ORDER — RIVAROXABAN 20 MG/1
20 TABLET, FILM COATED ORAL DAILY
COMMUNITY
Start: 2025-02-13

## 2025-02-26 RX ORDER — AMIODARONE HYDROCHLORIDE 200 MG/1
TABLET ORAL
COMMUNITY
Start: 2024-12-14 | End: 2025-03-18

## 2025-02-26 RX ORDER — AMIODARONE HYDROCHLORIDE 200 MG/1
200 TABLET ORAL DAILY
Qty: 90 TABLET | Refills: 3 | Status: SHIPPED | OUTPATIENT
Start: 2025-02-26

## 2025-02-26 RX ORDER — PSYLLIUM HUSK 0.4 G
1000 CAPSULE ORAL DAILY
COMMUNITY

## 2025-02-26 RX ORDER — HYDROCODONE BITARTRATE AND ACETAMINOPHEN 5; 325 MG/1; MG/1
TABLET ORAL
COMMUNITY
Start: 2024-11-25

## 2025-02-26 ASSESSMENT — PATIENT HEALTH QUESTIONNAIRE - PHQ9
SUM OF ALL RESPONSES TO PHQ9 QUESTIONS 1 & 2: 0
2. FEELING DOWN, DEPRESSED OR HOPELESS: NOT AT ALL
SUM OF ALL RESPONSES TO PHQ QUESTIONS 1-9: 0
1. LITTLE INTEREST OR PLEASURE IN DOING THINGS: NOT AT ALL
SUM OF ALL RESPONSES TO PHQ QUESTIONS 1-9: 0

## 2025-02-26 ASSESSMENT — ENCOUNTER SYMPTOMS
GASTROINTESTINAL NEGATIVE: 1
ALLERGIC/IMMUNOLOGIC NEGATIVE: 1
EYES NEGATIVE: 1
SHORTNESS OF BREATH: 1

## 2025-02-26 NOTE — PROGRESS NOTES
1. \"Have you been to the ER, urgent care clinic since your last visit?  Hospitalized since your last visit?\" Reviewed by Dr. Bang Finn  2. \"Have you seen or consulted any other health care providers outside of the [facility] since your last visit?\" Reviewed by Dr. Bang Finn

## 2025-02-26 NOTE — PROGRESS NOTES
Hayley Woodson (:  1945) is a 79 y.o. female,Established patient, here for evaluation of the following chief complaint(s):  Follow-Up from Hospital (post)    Subjective   SUBJECTIVE/OBJECTIVE:  he has a history of congestive heart failure, shortness of breath and paroxysmal atrial fibrillation. She also has a history of DVT and pulmonary embolus approximately 3 to 4 years ago and was started on Coumadin. After discharge, she was changed to Xarelto, but had recurrent DVT while taking that medication. She was not on any anticoagulation, but is now back on Xarelto. She has a history of bilateral knee pain and underwent bilateral knee replacement with Dr. Lujan. She has a history of DVT in the past and apparently underwent placement of a Priti filter prior to surgery.        History of Present Illness  The patient is a 79-year-old female who presents for atrial fibrillation, hypertension, chronic kidney disease stage 3a, pituitary tumor, memory issues, knee pain.  She also sustained a pulmonary embolus on Xarelto.    She has been prescribed amiodarone and spironolactone for her atrial fibrillation, medications she was not previously taking. She continues her regimen of Entresto and Farxiga. She reports the development of bruises on her legs. She does not experience any shortness of breath.    She does not monitor her blood pressure at home but has ordered a blood pressure cuff. A blood pressure reading taken at [facility] approximately one month ago was within normal limits.    She has been under the care of Dr. Zepeda, a vascular specialist, who initially prescribed Xarelto 15 mg, later increased to 20 mg.    She has been experiencing memory issues and has undergone two MRIs under the care of Dr. Horton, a neurologist. The first MRI revealed a benign mass in the pituitary gland, prompting a second MRI. She has an upcoming appointment with Dr. Horton and has been advised to undergo an eye test.

## 2025-04-27 RX ORDER — SACUBITRIL AND VALSARTAN 49; 51 MG/1; MG/1
1 TABLET, FILM COATED ORAL 2 TIMES DAILY
Qty: 180 TABLET | Refills: 3 | Status: SHIPPED | OUTPATIENT
Start: 2025-04-27

## 2025-05-13 ENCOUNTER — OFFICE VISIT (OUTPATIENT)
Age: 80
End: 2025-05-13
Payer: MEDICARE

## 2025-05-13 VITALS
SYSTOLIC BLOOD PRESSURE: 112 MMHG | OXYGEN SATURATION: 95 % | HEIGHT: 66 IN | TEMPERATURE: 97.2 F | DIASTOLIC BLOOD PRESSURE: 60 MMHG | HEART RATE: 55 BPM | BODY MASS INDEX: 33.56 KG/M2 | WEIGHT: 208.8 LBS

## 2025-05-13 DIAGNOSIS — R94.31 ABNORMAL ECG: ICD-10-CM

## 2025-05-13 DIAGNOSIS — I10 PRIMARY HYPERTENSION: ICD-10-CM

## 2025-05-13 DIAGNOSIS — R01.1 SYSTOLIC MURMUR: ICD-10-CM

## 2025-05-13 DIAGNOSIS — I48.0 PAROXYSMAL ATRIAL FIBRILLATION (HCC): ICD-10-CM

## 2025-05-13 DIAGNOSIS — R06.02 EXERTIONAL SHORTNESS OF BREATH: Primary | ICD-10-CM

## 2025-05-13 DIAGNOSIS — Z79.01 LONG TERM (CURRENT) USE OF ANTICOAGULANTS: ICD-10-CM

## 2025-05-13 DIAGNOSIS — I50.42 CHRONIC COMBINED SYSTOLIC AND DIASTOLIC CHF (CONGESTIVE HEART FAILURE) (HCC): ICD-10-CM

## 2025-05-13 DIAGNOSIS — I42.0 CONGESTIVE CARDIOMYOPATHY (HCC): ICD-10-CM

## 2025-05-13 DIAGNOSIS — I26.93 SINGLE SUBSEGMENTAL PULMONARY EMBOLISM WITHOUT ACUTE COR PULMONALE (HCC): ICD-10-CM

## 2025-05-13 DIAGNOSIS — I35.9 AORTIC VALVE DISEASE: ICD-10-CM

## 2025-05-13 DIAGNOSIS — I44.7 LEFT BUNDLE BRANCH BLOCK (LBBB): ICD-10-CM

## 2025-05-13 PROCEDURE — 1126F AMNT PAIN NOTED NONE PRSNT: CPT | Performed by: INTERNAL MEDICINE

## 2025-05-13 PROCEDURE — 3074F SYST BP LT 130 MM HG: CPT | Performed by: INTERNAL MEDICINE

## 2025-05-13 PROCEDURE — 99215 OFFICE O/P EST HI 40 MIN: CPT | Performed by: INTERNAL MEDICINE

## 2025-05-13 PROCEDURE — 1123F ACP DISCUSS/DSCN MKR DOCD: CPT | Performed by: INTERNAL MEDICINE

## 2025-05-13 PROCEDURE — 3078F DIAST BP <80 MM HG: CPT | Performed by: INTERNAL MEDICINE

## 2025-05-13 ASSESSMENT — ENCOUNTER SYMPTOMS
GASTROINTESTINAL NEGATIVE: 1
EYES NEGATIVE: 1
ALLERGIC/IMMUNOLOGIC NEGATIVE: 1
SHORTNESS OF BREATH: 1

## 2025-05-13 ASSESSMENT — PATIENT HEALTH QUESTIONNAIRE - PHQ9
1. LITTLE INTEREST OR PLEASURE IN DOING THINGS: NOT AT ALL
SUM OF ALL RESPONSES TO PHQ QUESTIONS 1-9: 0
SUM OF ALL RESPONSES TO PHQ QUESTIONS 1-9: 0
2. FEELING DOWN, DEPRESSED OR HOPELESS: NOT AT ALL
SUM OF ALL RESPONSES TO PHQ QUESTIONS 1-9: 0
SUM OF ALL RESPONSES TO PHQ QUESTIONS 1-9: 0

## 2025-05-13 NOTE — PROGRESS NOTES
Hayley Woodson (:  1945) is a 79 y.o. female,Established patient, here for evaluation of the following chief complaint(s):  Follow-up (7months)      Subjective   SUBJECTIVE/OBJECTIVE:  History of Present Illness  Patient presents today for follow-up.  She has a history of congestive heart failure, shortness of breath and paroxysmal atrial fibrillation. She also has a history of DVT and pulmonary embolus approximately 3 to 4 years ago and was started on Coumadin. After discharge, she was changed to Xarelto, but had recurrent DVT while taking that medication. She was not on any anticoagulation, but is now back on Xarelto. She has a history of bilateral knee pain and underwent bilateral knee replacement with Dr. Lujan. She has a history of DVT in the past and apparently underwent placement of a [place] filter prior to surgery.    She reports experiencing significant discomfort in her left hip and both knees. She is under the care of a rheumatologist, Dr. 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    She has been grappling with memory issues and is scheduled for an MRI next week due to a benign mass in her pituitary gland. She reports no visual disturbances.    She is not experiencing any shortness of breath or chest discomfort. However, she recalls an episode of nausea and lightheadedness approximately a month ago, which has since resolved. She also reports no palpitations.    She has been experiencing hand tremors for an extended period, which she attributes to her prednisone medication. Despite discontinuing the medication, the tremors persist. She continues to take amiodarone for her heart rhythm.    I have carefully reviewed all available medical records, previous office notes, lab, x-ray and procedure reports    History reviewed. No pertinent past medical history.     History reviewed. No pertinent surgical history.     Allergies

## 2025-05-13 NOTE — PROGRESS NOTES
1. \"Have you been to the ER, urgent care clinic since your last visit?  Hospitalized since your last visit?\" Reviewed by Dr. Bang Finn    2. \"Have you seen or consulted any other health care providers outside of the [facility] since your last visit?\" Reviewed by Dr. Bang Finn

## 2025-05-23 ENCOUNTER — TELEPHONE (OUTPATIENT)
Age: 80
End: 2025-05-23

## 2025-05-23 NOTE — TELEPHONE ENCOUNTER
Contacted pt at  number.  Two patient Identifiers confirmed. Informed pt per Dr Finn.  Pt verbalized understanding.

## 2025-05-23 NOTE — TELEPHONE ENCOUNTER
Per Dr Finn: . Can 1 of you call her May 23 which is the following Friday to see if holding the amiodarone had any impact on her degree of tremor and let me know

## 2025-05-23 NOTE — TELEPHONE ENCOUNTER
Contacted pt at  number. Two patient Identifiers confirmed. Informed pt per Dr Finn. Pt stated \" I still have them but not as much but they are still there. Pt inquired  Pt verbalized understanding.

## 2025-06-03 ENCOUNTER — TELEPHONE (OUTPATIENT)
Age: 80
End: 2025-06-03

## 2025-06-03 NOTE — TELEPHONE ENCOUNTER
Incoming from pt. Two patient Identifiers confirmed. Pt stated since restarting amiodarone she has had increased tremors. Pt stated she has an appt with Dr JALEESA Finn in July. Pt stated \" I feel evita shaky.\"   Pt stated she would like a call from Dr Finn because she is very concerned. Informed pt will forward message to provider.

## 2025-06-09 NOTE — TELEPHONE ENCOUNTER
Attempted to contact pt at  number, someone answered phone and hung up x 2. Will continue to try to contact pt.

## 2025-06-13 NOTE — TELEPHONE ENCOUNTER
Attempted to contact pt at  number, phone picked up and hung up.  Will continue to try to contact pt.

## 2025-06-17 NOTE — TELEPHONE ENCOUNTER
Contacted pt at  number.  Two patient Identifiers confirmed. Informed pt per Dr Finn to stop amiodarone.  Pt stated she also stopped taking duloxetine 40 mg tab as well because it may cause tremors as well. Pt verbalized understanding.